# Patient Record
Sex: FEMALE | Race: WHITE | NOT HISPANIC OR LATINO | Employment: FULL TIME | URBAN - METROPOLITAN AREA
[De-identification: names, ages, dates, MRNs, and addresses within clinical notes are randomized per-mention and may not be internally consistent; named-entity substitution may affect disease eponyms.]

---

## 2017-05-12 ENCOUNTER — HOSPITAL ENCOUNTER (INPATIENT)
Facility: HOSPITAL | Age: 47
LOS: 4 days | Discharge: HOME | DRG: 389 | End: 2017-05-17
Attending: EMERGENCY MEDICINE | Admitting: SURGERY
Payer: COMMERCIAL

## 2017-05-12 DIAGNOSIS — K56.609 SMALL BOWEL OBSTRUCTION: ICD-10-CM

## 2017-05-12 DIAGNOSIS — R11.2 NON-INTRACTABLE VOMITING WITH NAUSEA, UNSPECIFIED VOMITING TYPE: ICD-10-CM

## 2017-05-12 DIAGNOSIS — R10.84 GENERALIZED ABDOMINAL PAIN: ICD-10-CM

## 2017-05-12 DIAGNOSIS — K56.609 SBO (SMALL BOWEL OBSTRUCTION): Primary | ICD-10-CM

## 2017-05-12 DIAGNOSIS — E87.6 HYPOKALEMIA: ICD-10-CM

## 2017-05-12 LAB
BASOPHILS # BLD AUTO: 0.05 K/UL
BASOPHILS NFR BLD: 0.3 %
DIFFERENTIAL METHOD: ABNORMAL
EOSINOPHIL # BLD AUTO: 0.1 K/UL
EOSINOPHIL NFR BLD: 0.3 %
ERYTHROCYTE [DISTWIDTH] IN BLOOD BY AUTOMATED COUNT: 12.8 %
HCT VFR BLD AUTO: 40.5 %
HGB BLD-MCNC: 14.4 G/DL
LACTATE SERPL-SCNC: 4.1 MMOL/L
LYMPHOCYTES # BLD AUTO: 2.2 K/UL
LYMPHOCYTES NFR BLD: 11.7 %
MCH RBC QN AUTO: 30.5 PG
MCHC RBC AUTO-ENTMCNC: 35.6 %
MCV RBC AUTO: 86 FL
MONOCYTES # BLD AUTO: 1.2 K/UL
MONOCYTES NFR BLD: 6.4 %
NEUTROPHILS # BLD AUTO: 15.4 K/UL
NEUTROPHILS NFR BLD: 81 %
PLATELET # BLD AUTO: 271 K/UL
PMV BLD AUTO: 9.5 FL
RBC # BLD AUTO: 4.72 M/UL
WBC # BLD AUTO: 18.99 K/UL

## 2017-05-12 PROCEDURE — 96375 TX/PRO/DX INJ NEW DRUG ADDON: CPT

## 2017-05-12 PROCEDURE — 81025 URINE PREGNANCY TEST: CPT | Performed by: EMERGENCY MEDICINE

## 2017-05-12 PROCEDURE — 25000003 PHARM REV CODE 250: Performed by: EMERGENCY MEDICINE

## 2017-05-12 PROCEDURE — 11000001 HC ACUTE MED/SURG PRIVATE ROOM

## 2017-05-12 PROCEDURE — 80053 COMPREHEN METABOLIC PANEL: CPT

## 2017-05-12 PROCEDURE — 96361 HYDRATE IV INFUSION ADD-ON: CPT

## 2017-05-12 PROCEDURE — 63600175 PHARM REV CODE 636 W HCPCS: Performed by: EMERGENCY MEDICINE

## 2017-05-12 PROCEDURE — 83690 ASSAY OF LIPASE: CPT

## 2017-05-12 PROCEDURE — 96374 THER/PROPH/DIAG INJ IV PUSH: CPT

## 2017-05-12 PROCEDURE — 99285 EMERGENCY DEPT VISIT HI MDM: CPT | Mod: 25

## 2017-05-12 PROCEDURE — 85025 COMPLETE CBC W/AUTO DIFF WBC: CPT

## 2017-05-12 PROCEDURE — 83605 ASSAY OF LACTIC ACID: CPT

## 2017-05-12 PROCEDURE — 99285 EMERGENCY DEPT VISIT HI MDM: CPT | Mod: ,,, | Performed by: EMERGENCY MEDICINE

## 2017-05-12 RX ORDER — ESCITALOPRAM OXALATE 10 MG/1
10 TABLET ORAL DAILY
COMMUNITY
End: 2017-10-03

## 2017-05-12 RX ORDER — MORPHINE SULFATE 2 MG/ML
6 INJECTION, SOLUTION INTRAMUSCULAR; INTRAVENOUS
Status: COMPLETED | OUTPATIENT
Start: 2017-05-12 | End: 2017-05-12

## 2017-05-12 RX ORDER — ONDANSETRON 2 MG/ML
4 INJECTION INTRAMUSCULAR; INTRAVENOUS
Status: COMPLETED | OUTPATIENT
Start: 2017-05-12 | End: 2017-05-12

## 2017-05-12 RX ADMIN — MORPHINE SULFATE 6 MG: 2 INJECTION, SOLUTION INTRAMUSCULAR; INTRAVENOUS at 11:05

## 2017-05-12 RX ADMIN — ONDANSETRON 4 MG: 2 INJECTION INTRAMUSCULAR; INTRAVENOUS at 11:05

## 2017-05-12 RX ADMIN — SODIUM CHLORIDE, SODIUM LACTATE, POTASSIUM CHLORIDE, AND CALCIUM CHLORIDE 1000 ML: .6; .31; .03; .02 INJECTION, SOLUTION INTRAVENOUS at 11:05

## 2017-05-12 NOTE — IP AVS SNAPSHOT
[facility]  [address]  [facility] [address]  Phone: [phone]           Patient Discharge Instructions   Our goal is to set you up for success. This packet includes information on your condition, medications, and your home care.  It will help you care for yourself to prevent having to return to the hospital.     Please ask your nurse if you have any questions.      There are many details to remember when preparing to leave the hospital. Here is what you will need to do:    1. Take your medicine. If you are prescribed medications, review your Medication List on the following pages. You may have new medications to  at the pharmacy and others that you'll need to stop taking. Review the instructions for how and when to take your medications. Talk with your doctor or nurses if you are unsure of what to do.     2. Go to your follow-up appointments. Specific follow-up information is listed in the following pages. Your may be contacted by a nurse or clinical provider about future appointments. Be sure we have all of the phone numbers to reach you. Please contact your provider's office if you are unable to make an appointment.     3. Watch for warning signs. Your doctor or nurse will give you detailed warning signs to watch for and when to call for assistance. These instructions may also include educational information about your condition. If you experience any of warning signs to your health, call your doctor.           [facility]  Unless otherwise directed by your provider, please   contact [facility], our nurse care line   that is available for 24/7 assistance.     [phone] (toll-free)     Registered nurses in the [facility]   provide: appointment scheduling, clinical advisement, health education, and other advisory services.                  ** Verify the list of medication(s) below is accurate and up to date. Carry this with you in case of  emergency. If your medications have changed, please notify your healthcare provider.             Medication List      START taking these medications        Additional Info                      docusate sodium 50 MG capsule   Commonly known as:  COLACE   Refills:  0   Dose:  50 mg    Last time this was given:  50 mg on 5/17/2017  8:38 AM   Instructions:  Take 1 capsule (50 mg total) by mouth once daily.     Begin Date    AM    Noon    PM    Bedtime       Lactobacillus rhamnosus GG 10 billion cell capsule   Commonly known as:  CULTURELLE   Refills:  0   Dose:  1 capsule    Last time this was given:  1 capsule on 5/17/2017  8:38 AM   Instructions:  Take 1 capsule by mouth once daily.     Begin Date    AM    Noon    PM    Bedtime       ondansetron 4 MG Tbdl   Commonly known as:  ZOFRAN-ODT   Quantity:  20 tablet   Refills:  0   Dose:  4 mg    Instructions:  Take 1 tablet (4 mg total) by mouth every 8 (eight) hours as needed.     Begin Date    AM    Noon    PM    Bedtime       polyethylene glycol 17 gram Pwpk   Commonly known as:  GLYCOLAX   Refills:  0   Dose:  17 g    Last time this was given:  17 g on 5/17/2017  8:38 AM   Instructions:  Take 17 g by mouth once daily.     Begin Date    AM    Noon    PM    Bedtime         CONTINUE taking these medications        Additional Info                      escitalopram oxalate 10 MG tablet   Commonly known as:  LEXAPRO   Refills:  0   Dose:  10 mg    Instructions:  Take 10 mg by mouth once daily.     Begin Date    AM    Noon    PM    Bedtime            Where to Get Your Medications      These medications were sent to [email or web address] [id number]  [facility] [address] [facility] [address]  [facility] [place] [address]     Phone:  [phone]     ondansetron 4 MG Tbdl         You can get these medications from any pharmacy     You don't need a prescription for these medications     docusate sodium 50 MG capsule    Lactobacillus  rhamnosus GG 10 billion cell capsule         Information about where to get these medications is not yet available     ! Ask your nurse or doctor about these medications     polyethylene glycol 17 gram Pwpk                  Please bring to all follow up appointments:    1. A copy of your discharge instructions.  2. All medicines you are currently taking in their original bottles.  3. Identification and insurance card.    Please arrive 15 minutes ahead of scheduled appointment time.    Please call 24 hours in advance if you must reschedule your appointment and/or time.        Your Scheduled Appointments     May 30, 2017  9:30 AM CDT   Hospital Follow Up with TORIN Dumont - Internal Medicine ([facility])    [address] [place] [id number]   [phone]            Jul 25, 2017 10:40 AM CDT   Physical with MD Heath Albright josh - Internal Medicine ([facility])    [address] [place] [address]   [phone]              Follow-up Information     Follow up with Alisha Barreto PA-C On 5/30/2017.    Specialty:  Internal Medicine    Why:  Hospital follow-up for SBO: 1. NP Appt: 5/30/17 at 09:30 AM. 2. Establish care w/Primary Care Dr (PCP) Dr. Jamal Pereira no appts available until: 7/25/17 at 10:40 AM.     Contact information:    [address] [place] [address]  [phone]          Discharge Instructions     Future Orders    Activity as tolerated     Call MD for:  difficulty breathing or increased cough     Call MD for:  persistent nausea and vomiting or diarrhea     Call MD for:  redness, tenderness, or signs of infection (pain, swelling, redness, odor or green/yellow discharge around incision site)     Call MD for:  severe uncontrolled pain     Call MD for:  temperature >100.4     Diet general     Questions:    Total calories:      Fat restriction, if any:      Protein  "restriction, if any:      Na restriction, if any:      Fluid restriction:      Additional restrictions:          Primary Diagnosis     Your primary diagnosis was:  Small Bowel Obstruction      Admission Information     Date & Time Provider Department CSN    5/12/2017 10:58 PM Baldemar Vasquez MD [facility] [id number]      Care Providers     Provider Role Specialty Primary office phone    Baldemar Vasquez MD Attending Provider General Surgery [phone]      Your Vitals Were     BP Pulse Temp Resp Height Weight    144/69 (BP Location: Left arm, Patient Position: Lying, BP Method: Automatic) 60 98.6 °F (37 °C) (Oral) 18 5' 4" (1.626 m) 50.8 kg (112 lb)    Last Period SpO2 BMI          04/21/2017 98% 19.22 kg/m2        Recent Lab Values     No lab values to display.      Allergies as of 5/17/2017     No Known Allergies      Advance Directives     An advance directive is a document which, in the event you are no longer able to make decisions for yourself, tells your healthcare team what kind of treatment you do or do not want to receive, or who you would like to make those decisions for you.  If you do not currently have an advance directive, [facility] encourages you to create one.  For more information call:  [phone]), [phone] ([phone]),  or log on to [email or web address].        Language Assistance Services     ATTENTION: Language assistance services are available, free of charge. Please call [phone].      ATENCIÓN: Si habla español, tiene a painter disposición servicios gratuitos de asistencia lingüística. Llame al [phone].     [facility] Ý: N?u b?n nói Ti?ng Vi?t, có các d?ch v? h? tr? ngôn ng? mi?n phí dành cho b?n. G?i s? [phone].         [facility] complies with applicable Federal civil rights laws and does not discriminate on the basis of race, color, national origin, age, disability, or sex.        "

## 2017-05-13 PROBLEM — K56.609 SMALL BOWEL OBSTRUCTION: Status: ACTIVE | Noted: 2017-05-13

## 2017-05-13 PROBLEM — K56.609 SBO (SMALL BOWEL OBSTRUCTION): Status: ACTIVE | Noted: 2017-05-13

## 2017-05-13 LAB
ALBUMIN SERPL BCP-MCNC: 4.8 G/DL
ALP SERPL-CCNC: 44 U/L
ALT SERPL W/O P-5'-P-CCNC: 13 U/L
ANION GAP SERPL CALC-SCNC: 15 MMOL/L
ANION GAP SERPL CALC-SCNC: 8 MMOL/L
AST SERPL-CCNC: 17 U/L
B-HCG UR QL: NEGATIVE
BACTERIA #/AREA URNS AUTO: NORMAL /HPF
BASOPHILS # BLD AUTO: 0.02 K/UL
BASOPHILS NFR BLD: 0.2 %
BILIRUB SERPL-MCNC: 0.8 MG/DL
BILIRUB UR QL STRIP: NEGATIVE
BUN SERPL-MCNC: 11 MG/DL
BUN SERPL-MCNC: 18 MG/DL
CALCIUM SERPL-MCNC: 10.8 MG/DL
CALCIUM SERPL-MCNC: 8.8 MG/DL
CHLORIDE SERPL-SCNC: 101 MMOL/L
CHLORIDE SERPL-SCNC: 108 MMOL/L
CLARITY UR REFRACT.AUTO: ABNORMAL
CO2 SERPL-SCNC: 21 MMOL/L
CO2 SERPL-SCNC: 25 MMOL/L
COLOR UR AUTO: YELLOW
CREAT SERPL-MCNC: 0.8 MG/DL
CREAT SERPL-MCNC: 1 MG/DL
CTP QC/QA: YES
DIFFERENTIAL METHOD: ABNORMAL
EOSINOPHIL # BLD AUTO: 0.1 K/UL
EOSINOPHIL NFR BLD: 0.6 %
ERYTHROCYTE [DISTWIDTH] IN BLOOD BY AUTOMATED COUNT: 13.3 %
EST. GFR  (AFRICAN AMERICAN): >60 ML/MIN/1.73 M^2
EST. GFR  (AFRICAN AMERICAN): >60 ML/MIN/1.73 M^2
EST. GFR  (NON AFRICAN AMERICAN): >60 ML/MIN/1.73 M^2
EST. GFR  (NON AFRICAN AMERICAN): >60 ML/MIN/1.73 M^2
GLUCOSE SERPL-MCNC: 103 MG/DL
GLUCOSE SERPL-MCNC: 168 MG/DL
GLUCOSE UR QL STRIP: NEGATIVE
HCT VFR BLD AUTO: 37.4 %
HGB BLD-MCNC: 12.5 G/DL
HGB UR QL STRIP: NEGATIVE
HYALINE CASTS UR QL AUTO: 0 /LPF
KETONES UR QL STRIP: ABNORMAL
LACTATE SERPL-SCNC: 0.8 MMOL/L
LEUKOCYTE ESTERASE UR QL STRIP: NEGATIVE
LIPASE SERPL-CCNC: 9 U/L
LYMPHOCYTES # BLD AUTO: 2.6 K/UL
LYMPHOCYTES NFR BLD: 20.9 %
MAGNESIUM SERPL-MCNC: 1.9 MG/DL
MCH RBC QN AUTO: 30.3 PG
MCHC RBC AUTO-ENTMCNC: 33.4 %
MCV RBC AUTO: 91 FL
MICROSCOPIC COMMENT: NORMAL
MONOCYTES # BLD AUTO: 0.8 K/UL
MONOCYTES NFR BLD: 6.2 %
NEUTROPHILS # BLD AUTO: 8.9 K/UL
NEUTROPHILS NFR BLD: 71.9 %
NITRITE UR QL STRIP: NEGATIVE
PH UR STRIP: >8 [PH] (ref 5–8)
PHOSPHATE SERPL-MCNC: 4.1 MG/DL
PLATELET # BLD AUTO: 232 K/UL
PMV BLD AUTO: 9.5 FL
POTASSIUM SERPL-SCNC: 3.4 MMOL/L
POTASSIUM SERPL-SCNC: 4.3 MMOL/L
PROT SERPL-MCNC: 8.3 G/DL
PROT UR QL STRIP: ABNORMAL
RBC # BLD AUTO: 4.12 M/UL
RBC #/AREA URNS AUTO: 1 /HPF (ref 0–4)
SODIUM SERPL-SCNC: 137 MMOL/L
SODIUM SERPL-SCNC: 141 MMOL/L
SP GR UR STRIP: 1.03 (ref 1–1.03)
SQUAMOUS #/AREA URNS AUTO: 1 /HPF
URN SPEC COLLECT METH UR: ABNORMAL
UROBILINOGEN UR STRIP-ACNC: NEGATIVE EU/DL
WBC # BLD AUTO: 12.32 K/UL
WBC #/AREA URNS AUTO: 0 /HPF (ref 0–5)

## 2017-05-13 PROCEDURE — 63600175 PHARM REV CODE 636 W HCPCS: Performed by: SURGERY

## 2017-05-13 PROCEDURE — 25000003 PHARM REV CODE 250: Performed by: SURGERY

## 2017-05-13 PROCEDURE — 11000001 HC ACUTE MED/SURG PRIVATE ROOM

## 2017-05-13 PROCEDURE — 99223 1ST HOSP IP/OBS HIGH 75: CPT | Mod: ,,, | Performed by: SURGERY

## 2017-05-13 PROCEDURE — 81001 URINALYSIS AUTO W/SCOPE: CPT

## 2017-05-13 PROCEDURE — 80048 BASIC METABOLIC PNL TOTAL CA: CPT

## 2017-05-13 PROCEDURE — 85025 COMPLETE CBC W/AUTO DIFF WBC: CPT

## 2017-05-13 PROCEDURE — 36415 COLL VENOUS BLD VENIPUNCTURE: CPT

## 2017-05-13 PROCEDURE — 25500020 PHARM REV CODE 255: Performed by: EMERGENCY MEDICINE

## 2017-05-13 PROCEDURE — 63600175 PHARM REV CODE 636 W HCPCS: Performed by: STUDENT IN AN ORGANIZED HEALTH CARE EDUCATION/TRAINING PROGRAM

## 2017-05-13 PROCEDURE — 25000003 PHARM REV CODE 250: Performed by: EMERGENCY MEDICINE

## 2017-05-13 PROCEDURE — 84100 ASSAY OF PHOSPHORUS: CPT

## 2017-05-13 PROCEDURE — 83735 ASSAY OF MAGNESIUM: CPT

## 2017-05-13 PROCEDURE — 83605 ASSAY OF LACTIC ACID: CPT

## 2017-05-13 RX ORDER — MORPHINE SULFATE 2 MG/ML
4 INJECTION, SOLUTION INTRAMUSCULAR; INTRAVENOUS EVERY 4 HOURS PRN
Status: DISCONTINUED | OUTPATIENT
Start: 2017-05-13 | End: 2017-05-14

## 2017-05-13 RX ORDER — LIDOCAINE HYDROCHLORIDE 20 MG/ML
JELLY TOPICAL
Status: DISCONTINUED | OUTPATIENT
Start: 2017-05-13 | End: 2017-05-17 | Stop reason: HOSPADM

## 2017-05-13 RX ORDER — LIDOCAINE HYDROCHLORIDE 20 MG/ML
JELLY TOPICAL
Status: COMPLETED | OUTPATIENT
Start: 2017-05-13 | End: 2017-05-13

## 2017-05-13 RX ORDER — MAGNESIUM SULFATE HEPTAHYDRATE 40 MG/ML
2 INJECTION, SOLUTION INTRAVENOUS ONCE
Status: COMPLETED | OUTPATIENT
Start: 2017-05-13 | End: 2017-05-13

## 2017-05-13 RX ORDER — SODIUM CHLORIDE 0.9 % (FLUSH) 0.9 %
3 SYRINGE (ML) INJECTION EVERY 8 HOURS
Status: DISCONTINUED | OUTPATIENT
Start: 2017-05-13 | End: 2017-05-17 | Stop reason: HOSPADM

## 2017-05-13 RX ORDER — KETOROLAC TROMETHAMINE 15 MG/ML
15 INJECTION, SOLUTION INTRAMUSCULAR; INTRAVENOUS ONCE
Status: ACTIVE | OUTPATIENT
Start: 2017-05-13 | End: 2017-05-16

## 2017-05-13 RX ORDER — MORPHINE SULFATE 2 MG/ML
2 INJECTION, SOLUTION INTRAMUSCULAR; INTRAVENOUS EVERY 4 HOURS PRN
Status: DISCONTINUED | OUTPATIENT
Start: 2017-05-13 | End: 2017-05-14

## 2017-05-13 RX ORDER — SODIUM CHLORIDE 9 MG/ML
INJECTION, SOLUTION INTRAVENOUS CONTINUOUS
Status: DISCONTINUED | OUTPATIENT
Start: 2017-05-13 | End: 2017-05-16

## 2017-05-13 RX ORDER — ENOXAPARIN SODIUM 100 MG/ML
40 INJECTION SUBCUTANEOUS
Status: DISCONTINUED | OUTPATIENT
Start: 2017-05-13 | End: 2017-05-17 | Stop reason: HOSPADM

## 2017-05-13 RX ORDER — ONDANSETRON 2 MG/ML
4 INJECTION INTRAMUSCULAR; INTRAVENOUS EVERY 6 HOURS PRN
Status: DISCONTINUED | OUTPATIENT
Start: 2017-05-13 | End: 2017-05-17 | Stop reason: HOSPADM

## 2017-05-13 RX ORDER — LORAZEPAM 2 MG/ML
0.5 INJECTION INTRAMUSCULAR
Status: COMPLETED | OUTPATIENT
Start: 2017-05-13 | End: 2017-05-13

## 2017-05-13 RX ADMIN — MORPHINE SULFATE 4 MG: 2 INJECTION, SOLUTION INTRAMUSCULAR; INTRAVENOUS at 10:05

## 2017-05-13 RX ADMIN — IOHEXOL 75 ML: 350 INJECTION, SOLUTION INTRAVENOUS at 01:05

## 2017-05-13 RX ADMIN — SODIUM CHLORIDE, SODIUM LACTATE, POTASSIUM CHLORIDE, AND CALCIUM CHLORIDE 1000 ML: .6; .31; .03; .02 INJECTION, SOLUTION INTRAVENOUS at 12:05

## 2017-05-13 RX ADMIN — SODIUM CHLORIDE: 0.9 INJECTION, SOLUTION INTRAVENOUS at 03:05

## 2017-05-13 RX ADMIN — Medication 3 ML: at 02:05

## 2017-05-13 RX ADMIN — MAGNESIUM SULFATE IN WATER 2 G: 40 INJECTION, SOLUTION INTRAVENOUS at 11:05

## 2017-05-13 RX ADMIN — LIDOCAINE HYDROCHLORIDE 10 ML: 20 JELLY TOPICAL at 03:05

## 2017-05-13 RX ADMIN — MORPHINE SULFATE 2 MG: 2 INJECTION, SOLUTION INTRAMUSCULAR; INTRAVENOUS at 05:05

## 2017-05-13 RX ADMIN — MORPHINE SULFATE 4 MG: 2 INJECTION, SOLUTION INTRAMUSCULAR; INTRAVENOUS at 08:05

## 2017-05-13 RX ADMIN — LORAZEPAM 0.5 MG: 2 INJECTION INTRAMUSCULAR; INTRAVENOUS at 02:05

## 2017-05-13 RX ADMIN — MORPHINE SULFATE 2 MG: 2 INJECTION, SOLUTION INTRAMUSCULAR; INTRAVENOUS at 02:05

## 2017-05-13 RX ADMIN — SODIUM CHLORIDE: 0.9 INJECTION, SOLUTION INTRAVENOUS at 02:05

## 2017-05-13 RX ADMIN — TOPICAL ANESTHETIC: 200 SPRAY DENTAL; PERIODONTAL at 03:05

## 2017-05-13 NOTE — ASSESSMENT & PLAN NOTE
47 y.o. female with history of pelvic surgery and IBS now presenting with acute onset of crampy abdominal discomfort, nausea and emesis. CT consistent with likely adhesive SBO, partial by history and exam.     -Admit to surgery, floor  -NPO  -NG tube placement; LIWS  -Ambulate  -DVT ppx  -IV fluids  -Lactic acidosis and leukocytosis - possibly dehydration related; monitor, hydrate and repeat  -Serial exams

## 2017-05-13 NOTE — PROVIDER PROGRESS NOTES - EMERGENCY DEPT.
Encounter Date: 5/12/2017    ED Physician Progress Notes       SCRIBE NOTE: I, Melchor Avilez, am scribing for, and in the presence of,  Dr. Squires.  Physician Statement: I, Dr. Squires, personally performed the services described in this documentation as scribed by Melchor Avilez in my presence, and it is both accurate and complete.     Physician Note:   This is a 47 y.o. female who presents for evaluation of acute onset severe abdominal pain with nausea and vomiting.     I initially evaluated this patient and ordered workup while in intake.  The patient will receive a full evaluation in an ED pod when space is available.  All results from tests ordered in intake will not be followed by the intake team, including myself. All results will be followed by the ED Pod team.

## 2017-05-13 NOTE — ED PROVIDER NOTES
Encounter Date: 5/12/2017    SCRIBE #1 NOTE: I, Marcos Saenz, am scribing for, and in the presence of,  Dr. Barragan. I have scribed the following portions of the note - the Resident attestation.       History     Chief Complaint   Patient presents with    Abdominal Pain     abdominal pain, nausea and vomiting     Review of patient's allergies indicates:  No Known Allergies  The history is provided by the patient and medical records.     47-year-old woman with history of surgical ovarian cyst removal in her 30s, chronic GI issues, depression presents with new diffuse abdominal pain, started around 7 PM and increased in intensity to severe, waxes and wanes in intensity but never entirely goes away, associated with nausea and 9 episodes of nonbloody vomiting.  Denies fever, diarrhea, constipation, dysuria, hematuria, vaginal bleeding, vaginal discharge.    Past Medical History:   Diagnosis Date    Depression      History reviewed. No pertinent surgical history.  History reviewed. No pertinent family history.  Social History   Substance Use Topics    Smoking status: Never Smoker    Smokeless tobacco: None    Alcohol use Yes     Review of Systems   Constitutional: Negative for chills and fever.   HENT: Negative for drooling and facial swelling.    Eyes: Negative for discharge and redness.   Respiratory: Negative for shortness of breath and stridor.    Cardiovascular: Negative for chest pain and leg swelling.   Gastrointestinal: Positive for abdominal pain, nausea and vomiting. Negative for constipation and diarrhea.   Genitourinary: Negative for dysuria, hematuria, vaginal bleeding and vaginal discharge.   Musculoskeletal: Negative for joint swelling and neck stiffness.   Skin: Negative for rash and wound.   Neurological: Negative for facial asymmetry and speech difficulty.   Psychiatric/Behavioral: Negative for agitation and confusion.       Physical Exam   Initial Vitals   BP Pulse Resp Temp SpO2   05/12/17 2247  05/12/17 2247 05/12/17 2247 05/12/17 2247 05/12/17 2247   116/55 70 16 97.9 °F (36.6 °C) 100 %     Physical Exam    Nursing note and vitals reviewed.  Constitutional: She appears well-developed and well-nourished. She is not diaphoretic. No distress.   HENT:   Head: Normocephalic and atraumatic.   Right Ear: External ear normal.   Left Ear: External ear normal.   Nose: Nose normal.   Mouth/Throat: Oropharynx is clear and moist.   Eyes: Conjunctivae are normal. Pupils are equal, round, and reactive to light. Right eye exhibits no discharge. Left eye exhibits no discharge. No scleral icterus.   Neck: Neck supple. No thyromegaly present. No tracheal deviation present. No JVD present.   Cardiovascular: Normal rate, regular rhythm, normal heart sounds and intact distal pulses. Exam reveals no gallop and no friction rub.    No murmur heard.  Pulmonary/Chest: Breath sounds normal. No stridor. No respiratory distress. She has no wheezes. She has no rhonchi. She has no rales. She exhibits no tenderness.   Abdominal: Soft. Bowel sounds are normal. She exhibits no distension and no mass. There is no tenderness. There is no rebound and no guarding.   Musculoskeletal: Normal range of motion. She exhibits no edema.   Lymphadenopathy:     She has no cervical adenopathy.   Neurological: She is alert and oriented to person, place, and time.   Skin: Skin is warm and dry.   Psychiatric: She has a normal mood and affect. Her behavior is normal.         ED Course   Procedures  Labs Reviewed   CBC W/ AUTO DIFFERENTIAL - Abnormal; Notable for the following:        Result Value    WBC 18.99 (*)     Gran # 15.4 (*)     Mono # 1.2 (*)     Gran% 81.0 (*)     Lymph% 11.7 (*)     All other components within normal limits   COMPREHENSIVE METABOLIC PANEL - Abnormal; Notable for the following:     Potassium 3.4 (*)     CO2 21 (*)     Glucose 168 (*)     Calcium 10.8 (*)     Alkaline Phosphatase 44 (*)     All other components within normal limits    URINALYSIS - Abnormal; Notable for the following:     Appearance, UA Hazy (*)     pH, UA >8.0 (*)     Protein, UA 1+ (*)     Ketones, UA 2+ (*)     All other components within normal limits   LACTIC ACID, PLASMA - Abnormal; Notable for the following:     Lactate (Lactic Acid) 4.1 (*)     All other components within normal limits   LIPASE   URINALYSIS MICROSCOPIC   BASIC METABOLIC PANEL   MAGNESIUM   PHOSPHORUS   CBC W/ AUTO DIFFERENTIAL   LACTIC ACID, PLASMA   POCT URINE PREGNANCY          HOII MDM:  Jennifer Caballero is a 47 y.o. female with history of surgical ovarian cyst removal in her 30s, chronic GI issues, depression presents with abdominal pain, nausea, and vomiting.  My exam after patient had received morphine and Zofran was normal, no abdominal tenderness.  Ddx includes gastritis, gastroenteritis, pancreatitis, cholecystitis, appendicitis, abscess, volvulus.    White count 19,000, elevated granulocytes.  Patient states that she had a steroid shot 5 days ago which may explain her leukocytosis.  Lactate 4.1.  CMP unremarkable including normal transaminases, low alkaline phosphatase, normal total bilirubin, normal lipase.  UA negative, UPT negative.    2 L LR for elevated lactate.  CT abdomen and pelvis with contrast pending.    RANDY Sexton 1:06 AM 05/13/2017    CT abdomen and pelvis with contrast significant for small bowel obstruction with questionable transition point.  No perforation or free fluid.    Consultation general surgery, anticipate admit to their service.    RANDY Sexton 1:47 AM 05/13/2017    General surgery admitting.    RANDY Sexton 3:23 AM 05/13/2017         Medical Decision Making:   History:   Old Medical Records: I decided to obtain old medical records.  Clinical Tests:   Lab Tests: Ordered and Reviewed  Radiological Study: Reviewed and Ordered            Scribe Attestation:   Scribe #1: I performed the above scribed service and the documentation  accurately describes the services I performed. I attest to the accuracy of the note.    Attending Attestation:   Physician Attestation Statement for Resident:  As the supervising MD   Physician Attestation Statement: I have personally seen and examined this patient.   I agree with the above history. -:   As the supervising MD I agree with the above PE.    As the supervising MD I agree with the above treatment, course, plan, and disposition.   -:     Healthy 47 y.o. female presents with abdominal pain, vomiting. Workup concerning for elevated WBC count and lactate. CT shows small bowel obstruction. We will admit to surgery with NG tube.   I have reviewed and agree with the residents interpretation of the following: lab data and CT scans.  I have reviewed the following: old records at this facility.          Physician Attestation for Scribe:  Physician Attestation Statement for Scribe #1: I, Dr. Barragan, reviewed documentation, as scribed by Marcos Saenz in my presence, and it is both accurate and complete.                 ED Course     Clinical Impression:   The primary encounter diagnosis was SBO (small bowel obstruction). Diagnoses of Generalized abdominal pain and Non-intractable vomiting with nausea, unspecified vomiting type were also pertinent to this visit.    Disposition:   Disposition: Admitted       Baldemar Parker MD  Resident  05/13/17 0323       Black Barragan MD  05/13/17 0337

## 2017-05-13 NOTE — NURSING
At 0345 Patient admitted to room 523A from the Emergency Room.  AAO x3.  Vital sings stable.  Afebrile.  NG tube to right nostril, checked for placement then connected to low intermittent wall suction - draining clear secretions.  18g catheter to the right antecubital, NS at 125cc/hr started. Patient refused SCD's and her enoxaparin injection; otherwise, she was cooperative this shift.  Charge nurse informed.  Patient stated she is in no pain and she plans on going home today so she can spend Mother's Day with her mother.

## 2017-05-13 NOTE — ED TRIAGE NOTES
Jennifer Caballero, a 47 y.o. female presents to the ED with c/o generalized abdominal pain that began today accompanied by N/V and diarrhea.       Chief Complaint   Patient presents with    Abdominal Pain     abdominal pain, nausea and vomiting     Review of patient's allergies indicates:  Allergies not on file  No past medical history on file.

## 2017-05-13 NOTE — SUBJECTIVE & OBJECTIVE
No current facility-administered medications on file prior to encounter.      No current outpatient prescriptions on file prior to encounter.       Review of patient's allergies indicates:  No Known Allergies    Past Medical History:   Diagnosis Date    Depression      History reviewed. No pertinent surgical history.  Family History     None        Social History Main Topics    Smoking status: Never Smoker    Smokeless tobacco: Not on file    Alcohol use Yes    Drug use: Not on file    Sexual activity: Not on file     Review of Systems   Constitutional: Negative for activity change, chills and fever.   HENT: Negative.    Respiratory: Negative.    Cardiovascular: Negative.    Gastrointestinal: Positive for abdominal pain, nausea and vomiting. Negative for abdominal distention.   Genitourinary: Negative.    Musculoskeletal: Negative.    Allergic/Immunologic: Negative for immunocompromised state.   Hematological: Does not bruise/bleed easily.   Psychiatric/Behavioral: The patient is nervous/anxious.      Objective:     Vital Signs (Most Recent):  Temp: 97.9 °F (36.6 °C) (05/12/17 2247)  Pulse: 65 (05/13/17 0031)  Resp: 16 (05/12/17 2247)  BP: 116/63 (05/13/17 0031)  SpO2: 100 % (05/13/17 0031) Vital Signs (24h Range):  Temp:  [97.9 °F (36.6 °C)] 97.9 °F (36.6 °C)  Pulse:  [65-79] 65  Resp:  [16] 16  SpO2:  [99 %-100 %] 100 %  BP: (103-134)/(55-63) 116/63     Weight: 50.8 kg (112 lb)  Body mass index is 19.22 kg/(m^2).    Physical Exam   Constitutional: She is oriented to person, place, and time. She appears well-developed and well-nourished. Distressed: anxious.   HENT:   Head: Normocephalic and atraumatic.   Eyes: EOM are normal. Pupils are equal, round, and reactive to light.   Neck: Normal range of motion. Neck supple.   Cardiovascular: Normal rate and regular rhythm.    Pulmonary/Chest: Effort normal and breath sounds normal. No respiratory distress.   Abdominal: Soft. She exhibits no distension. There is no  tenderness (non tender to my examination). There is no rebound and no guarding.   Musculoskeletal: Normal range of motion.   Neurological: She is alert and oriented to person, place, and time.   Skin: Skin is warm.   Psychiatric:   Very anxious       Significant Labs:  CBC:   Recent Labs  Lab 05/12/17  2319   WBC 18.99*   RBC 4.72   HGB 14.4   HCT 40.5      MCV 86   MCH 30.5   MCHC 35.6     BMP:   Recent Labs  Lab 05/12/17  2319   *      K 3.4*      CO2 21*   BUN 18   CREATININE 1.0   CALCIUM 10.8*     LFTs:   Recent Labs  Lab 05/12/17  2319   ALT 13   AST 17   ALKPHOS 44*   BILITOT 0.8   PROT 8.3   ALBUMIN 4.8     LA: 4    Significant Diagnostics:  I have reviewed all pertinent imaging results/findings within the past 24 hours.   CT Abdomen / Pelvis:  Multiple loops of dilated small bowel, with a questionable transition zone in the midline lower abdomen, suggestive of  an early distal small bowel obstruction.  No evidence of perforation.  There is a small amount of ascites surrounding the liver.  Multiple uterine fibroids.

## 2017-05-13 NOTE — H&P
[facility]  General Surgery  History & Physical    Patient Name: Jennifer Caballero  MRN: [id number]  Admission Date: 5/12/2017  Attending Physician: MD Pedro  Primary Care Provider: Luis Lopez MD    Patient information was obtained from patient, parent and ER records.     Subjective:     Chief Complaint/Reason for Admission: SBO    History of Present Illness: 47 y.o. female presenting with crampy severe mid/lower abdominal discomfort starting this evening around 5pm associated with multiple bouts of nausea and emesis. Has previously had a large fibroid removed via lower transverse incision about 10 years ago. Feeling better following pain and nausea medications but states she can still feel cramps. No fevers, chills, CP or SOB. No prior SBO. Last BM yesterday. Continues to pass flatus and some burping today.  Does have a history of self diagnosed IBS (mom with IBS) and lactose intolerance. Chronically with sporadic abdominal discomfort, cramps and diarrhea but this episode is different.      No current facility-administered medications on file prior to encounter.      No current outpatient prescriptions on file prior to encounter.       Review of patient's allergies indicates:  No Known Allergies    Past Medical History:   Diagnosis Date    Depression      History reviewed. No pertinent surgical history.  Family History     None        Social History Main Topics    Smoking status: Never Smoker    Smokeless tobacco: Not on file    Alcohol use Yes    Drug use: Not on file    Sexual activity: Not on file     Review of Systems   Constitutional: Negative for activity change, chills and fever.   HENT: Negative.    Respiratory: Negative.    Cardiovascular: Negative.    Gastrointestinal: Positive for abdominal pain, nausea and vomiting. Negative for abdominal distention.   Genitourinary: Negative.    Musculoskeletal: Negative.    Allergic/Immunologic: Negative for immunocompromised state.    Hematological: Does not bruise/bleed easily.   Psychiatric/Behavioral: The patient is nervous/anxious.      Objective:     Vital Signs (Most Recent):  Temp: 97.9 °F (36.6 °C) (05/12/17 2247)  Pulse: 65 (05/13/17 0031)  Resp: 16 (05/12/17 2247)  BP: 116/63 (05/13/17 0031)  SpO2: 100 % (05/13/17 0031) Vital Signs (24h Range):  Temp:  [97.9 °F (36.6 °C)] 97.9 °F (36.6 °C)  Pulse:  [65-79] 65  Resp:  [16] 16  SpO2:  [99 %-100 %] 100 %  BP: (103-134)/(55-63) 116/63     Weight: 50.8 kg (112 lb)  Body mass index is 19.22 kg/(m^2).    Physical Exam   Constitutional: She is oriented to person, place, and time. She appears well-developed and well-nourished. Distressed: anxious.   HENT:   Head: Normocephalic and atraumatic.   Eyes: EOM are normal. Pupils are equal, round, and reactive to light.   Neck: Normal range of motion. Neck supple.   Cardiovascular: Normal rate and regular rhythm.    Pulmonary/Chest: Effort normal and breath sounds normal. No respiratory distress.   Abdominal: Soft. She exhibits no distension. There is no tenderness (non tender to my examination). There is no rebound and no guarding.   Musculoskeletal: Normal range of motion.   Neurological: She is alert and oriented to person, place, and time.   Skin: Skin is warm.   Psychiatric:   Very anxious       Significant Labs:  CBC:   Recent Labs  Lab 05/12/17 2319   WBC 18.99*   RBC 4.72   HGB 14.4   HCT 40.5      MCV 86   MCH 30.5   MCHC 35.6     BMP:   Recent Labs  Lab 05/12/17 2319   *      K 3.4*      CO2 21*   BUN 18   CREATININE 1.0   CALCIUM 10.8*     LFTs:   Recent Labs  Lab 05/12/17 2319   ALT 13   AST 17   ALKPHOS 44*   BILITOT 0.8   PROT 8.3   ALBUMIN 4.8     LA: 4    Significant Diagnostics:  I have reviewed all pertinent imaging results/findings within the past 24 hours.   CT Abdomen / Pelvis:  Multiple loops of dilated small bowel, with a questionable transition zone in the midline lower abdomen, suggestive of  an  early distal small bowel obstruction.  No evidence of perforation.  There is a small amount of ascites surrounding the liver.  Multiple uterine fibroids.      Assessment/Plan:     * Small bowel obstruction  47 y.o. female with history of pelvic surgery and IBS now presenting with acute onset of crampy abdominal discomfort, nausea and emesis. CT consistent with likely adhesive SBO, partial by history and exam.     -Admit to surgery, floor  -NPO  -NG tube placement; LIWS  -Ambulate  -DVT ppx  -IV fluids  -Lactic acidosis and leukocytosis - possibly dehydration related; monitor, hydrate and repeat  -Serial exams    VTE Risk Mitigation         Ordered     enoxaparin injection 40 mg  Every 24 hours (non-standard times)     Route:  Subcutaneous        05/13/17 0218     Medium Risk of VTE  Once      05/13/17 0218     Place sequential compression device  Until discontinued      05/13/17 0218          Cesar Leonard MD  General Surgery  [facility]

## 2017-05-13 NOTE — NURSING
Patient transferred from the E.D. To Room 523A.  AAOx3.  No signs of cardiac or respiratory distress.  HOB elevated.  18 g S.L. To right AC.  NG tube to right nostril.  In no acute distress at this time.

## 2017-05-14 LAB
ANION GAP SERPL CALC-SCNC: 7 MMOL/L
BASOPHILS # BLD AUTO: 0.04 K/UL
BASOPHILS NFR BLD: 0.4 %
BUN SERPL-MCNC: 8 MG/DL
CALCIUM SERPL-MCNC: 8.6 MG/DL
CHLORIDE SERPL-SCNC: 107 MMOL/L
CO2 SERPL-SCNC: 28 MMOL/L
CREAT SERPL-MCNC: 0.8 MG/DL
DIFFERENTIAL METHOD: NORMAL
EOSINOPHIL # BLD AUTO: 0.2 K/UL
EOSINOPHIL NFR BLD: 2.5 %
ERYTHROCYTE [DISTWIDTH] IN BLOOD BY AUTOMATED COUNT: 13.6 %
EST. GFR  (AFRICAN AMERICAN): >60 ML/MIN/1.73 M^2
EST. GFR  (NON AFRICAN AMERICAN): >60 ML/MIN/1.73 M^2
GLUCOSE SERPL-MCNC: 88 MG/DL
HCT VFR BLD AUTO: 37.4 %
HGB BLD-MCNC: 12.5 G/DL
LYMPHOCYTES # BLD AUTO: 3.4 K/UL
LYMPHOCYTES NFR BLD: 35.7 %
MAGNESIUM SERPL-MCNC: 2.4 MG/DL
MCH RBC QN AUTO: 29.9 PG
MCHC RBC AUTO-ENTMCNC: 33.4 %
MCV RBC AUTO: 90 FL
MONOCYTES # BLD AUTO: 0.6 K/UL
MONOCYTES NFR BLD: 6.5 %
NEUTROPHILS # BLD AUTO: 5.2 K/UL
NEUTROPHILS NFR BLD: 54.7 %
PHOSPHATE SERPL-MCNC: 2.8 MG/DL
PLATELET # BLD AUTO: 260 K/UL
PMV BLD AUTO: 9.6 FL
POTASSIUM SERPL-SCNC: 4 MMOL/L
RBC # BLD AUTO: 4.18 M/UL
SODIUM SERPL-SCNC: 142 MMOL/L
WBC # BLD AUTO: 9.52 K/UL

## 2017-05-14 PROCEDURE — 85025 COMPLETE CBC W/AUTO DIFF WBC: CPT

## 2017-05-14 PROCEDURE — 80048 BASIC METABOLIC PNL TOTAL CA: CPT

## 2017-05-14 PROCEDURE — 99232 SBSQ HOSP IP/OBS MODERATE 35: CPT | Mod: ,,, | Performed by: SURGERY

## 2017-05-14 PROCEDURE — 11000001 HC ACUTE MED/SURG PRIVATE ROOM

## 2017-05-14 PROCEDURE — 63600175 PHARM REV CODE 636 W HCPCS: Performed by: STUDENT IN AN ORGANIZED HEALTH CARE EDUCATION/TRAINING PROGRAM

## 2017-05-14 PROCEDURE — 25000003 PHARM REV CODE 250: Performed by: SURGERY

## 2017-05-14 PROCEDURE — 84100 ASSAY OF PHOSPHORUS: CPT

## 2017-05-14 PROCEDURE — 36415 COLL VENOUS BLD VENIPUNCTURE: CPT

## 2017-05-14 PROCEDURE — 83735 ASSAY OF MAGNESIUM: CPT

## 2017-05-14 RX ORDER — ACETAMINOPHEN 10 MG/ML
1000 INJECTION, SOLUTION INTRAVENOUS EVERY 8 HOURS
Status: COMPLETED | OUTPATIENT
Start: 2017-05-14 | End: 2017-05-15

## 2017-05-14 RX ADMIN — ACETAMINOPHEN 1000 MG: 10 INJECTION, SOLUTION INTRAVENOUS at 12:05

## 2017-05-14 RX ADMIN — ACETAMINOPHEN 1000 MG: 10 INJECTION, SOLUTION INTRAVENOUS at 10:05

## 2017-05-14 RX ADMIN — Medication 3 ML: at 10:05

## 2017-05-14 RX ADMIN — Medication 3 ML: at 02:05

## 2017-05-14 NOTE — PLAN OF CARE
Problem: Patient Care Overview  Goal: Plan of Care Review  Outcome: Ongoing (interventions implemented as appropriate)   Pt AAOx3. No falls noted, fall precautions in place. No skin breakdown noted. Pt NPO. VSS. Will continue to monitor.

## 2017-05-14 NOTE — PROGRESS NOTES
Pt appears to be agitated and upset. She stated that she doesn't understand her diagnosis and why she is still hospitalized. She refuses to speak to any residents or interns. She strictly wants to speak to her attending physician. Nurse spoke with her about her plan of care while hospitalized and ensured her that she is in the best place to help her during this time.    MILI Solis

## 2017-05-14 NOTE — PROGRESS NOTES
[facility]  General Surgery  Progress Note    Subjective:     Interval History: Passed some gas overnight. No nausea or vomiting. AVSS. NGT output clearing.    Post-Op Info:  * No surgery found *          Medications:  Continuous Infusions:   sodium chloride 0.9% 125 mL/hr at 05/13/17 1421     Scheduled Meds:   enoxaparin  40 mg Subcutaneous Q24H    ketorolac  15 mg Intravenous Once    sodium chloride 0.9%  3 mL Intravenous Q8H     PRN Meds:benzocaine, lidocaine HCL 2%, morphine, morphine, ondansetron     Objective:     Vital Signs (Most Recent):  Temp: 98.1 °F (36.7 °C) (05/14/17 0819)  Pulse: (!) 58 (05/14/17 0819)  Resp: 18 (05/14/17 0819)  BP: 138/63 (05/14/17 0819)  SpO2: 98 % (05/14/17 0819) Vital Signs (24h Range):  Temp:  [97.5 °F (36.4 °C)-99.3 °F (37.4 °C)] 98.1 °F (36.7 °C)  Pulse:  [58-69] 58  Resp:  [16-18] 18  SpO2:  [97 %-100 %] 98 %  BP: (131-144)/(54-68) 138/63       Intake/Output Summary (Last 24 hours) at 05/14/17 1019  Last data filed at 05/14/17 0500   Gross per 24 hour   Intake             1827 ml   Output              250 ml   Net             1577 ml       Physical Exam  NAD  RRR  CTAB  Abd soft, ND/NT, hypo bowel sounds      Significant Labs:  CBC:   Recent Labs  Lab 05/14/17  0440   WBC 9.52   RBC 4.18   HGB 12.5   HCT 37.4      MCV 90   MCH 29.9   MCHC 33.4     CMP:   Recent Labs  Lab 05/12/17  2319  05/14/17  0440   *  < > 88   CALCIUM 10.8*  < > 8.6*   ALBUMIN 4.8  --   --    PROT 8.3  --   --      < > 142   K 3.4*  < > 4.0   CO2 21*  < > 28     < > 107   BUN 18  < > 8   CREATININE 1.0  < > 0.8   ALKPHOS 44*  --   --    ALT 13  --   --    AST 17  --   --    BILITOT 0.8  --   --    < > = values in this interval not displayed.    Assessment/Plan:   46 yo F with pSBO, potentially resolving  -Continue NGT, may DC in am if continues to be low  -NPO  -Ambulate  -serial abd exams    Active Diagnoses:    Diagnosis Date Noted POA     PRINCIPAL PROBLEM:  Small bowel obstruction [K56.69] 05/13/2017 Yes      Problems Resolved During this Admission:    Diagnosis Date Noted Date Resolved CHRISTIANOA         David Ortiz MD  General Surgery  [facility]

## 2017-05-15 PROBLEM — E87.6 HYPOKALEMIA: Status: ACTIVE | Noted: 2017-05-15

## 2017-05-15 LAB
ANION GAP SERPL CALC-SCNC: 13 MMOL/L
BASOPHILS # BLD AUTO: 0.04 K/UL
BASOPHILS NFR BLD: 0.5 %
BUN SERPL-MCNC: 13 MG/DL
CALCIUM SERPL-MCNC: 8.6 MG/DL
CHLORIDE SERPL-SCNC: 105 MMOL/L
CO2 SERPL-SCNC: 21 MMOL/L
CREAT SERPL-MCNC: 0.7 MG/DL
DIFFERENTIAL METHOD: ABNORMAL
EOSINOPHIL # BLD AUTO: 0.2 K/UL
EOSINOPHIL NFR BLD: 1.8 %
ERYTHROCYTE [DISTWIDTH] IN BLOOD BY AUTOMATED COUNT: 13.2 %
EST. GFR  (AFRICAN AMERICAN): >60 ML/MIN/1.73 M^2
EST. GFR  (NON AFRICAN AMERICAN): >60 ML/MIN/1.73 M^2
GLUCOSE SERPL-MCNC: 60 MG/DL
HCT VFR BLD AUTO: 34.8 %
HGB BLD-MCNC: 11.8 G/DL
LYMPHOCYTES # BLD AUTO: 2.3 K/UL
LYMPHOCYTES NFR BLD: 28.3 %
MAGNESIUM SERPL-MCNC: 2 MG/DL
MCH RBC QN AUTO: 29.9 PG
MCHC RBC AUTO-ENTMCNC: 33.9 %
MCV RBC AUTO: 88 FL
MONOCYTES # BLD AUTO: 0.5 K/UL
MONOCYTES NFR BLD: 5.9 %
NEUTROPHILS # BLD AUTO: 5.2 K/UL
NEUTROPHILS NFR BLD: 63.4 %
PHOSPHATE SERPL-MCNC: 3.2 MG/DL
PLATELET # BLD AUTO: 239 K/UL
PMV BLD AUTO: 9.8 FL
POTASSIUM SERPL-SCNC: 3.8 MMOL/L
RBC # BLD AUTO: 3.94 M/UL
SODIUM SERPL-SCNC: 139 MMOL/L
WBC # BLD AUTO: 8.14 K/UL

## 2017-05-15 PROCEDURE — 36415 COLL VENOUS BLD VENIPUNCTURE: CPT

## 2017-05-15 PROCEDURE — 63600175 PHARM REV CODE 636 W HCPCS: Performed by: STUDENT IN AN ORGANIZED HEALTH CARE EDUCATION/TRAINING PROGRAM

## 2017-05-15 PROCEDURE — 11000001 HC ACUTE MED/SURG PRIVATE ROOM

## 2017-05-15 PROCEDURE — 99232 SBSQ HOSP IP/OBS MODERATE 35: CPT | Mod: ,,, | Performed by: SURGERY

## 2017-05-15 PROCEDURE — 84100 ASSAY OF PHOSPHORUS: CPT

## 2017-05-15 PROCEDURE — 63600175 PHARM REV CODE 636 W HCPCS: Performed by: SURGERY

## 2017-05-15 PROCEDURE — 85025 COMPLETE CBC W/AUTO DIFF WBC: CPT

## 2017-05-15 PROCEDURE — 25000003 PHARM REV CODE 250: Performed by: SURGERY

## 2017-05-15 PROCEDURE — 83735 ASSAY OF MAGNESIUM: CPT

## 2017-05-15 PROCEDURE — 80048 BASIC METABOLIC PNL TOTAL CA: CPT

## 2017-05-15 PROCEDURE — 63600175 PHARM REV CODE 636 W HCPCS: Performed by: PHYSICIAN ASSISTANT

## 2017-05-15 RX ORDER — POTASSIUM CHLORIDE 7.45 MG/ML
10 INJECTION INTRAVENOUS
Status: COMPLETED | OUTPATIENT
Start: 2017-05-15 | End: 2017-05-15

## 2017-05-15 RX ADMIN — ENOXAPARIN SODIUM 40 MG: 100 INJECTION SUBCUTANEOUS at 05:05

## 2017-05-15 RX ADMIN — POTASSIUM CHLORIDE 10 MEQ: 10 INJECTION, SOLUTION INTRAVENOUS at 12:05

## 2017-05-15 RX ADMIN — ACETAMINOPHEN 1000 MG: 10 INJECTION, SOLUTION INTRAVENOUS at 05:05

## 2017-05-15 RX ADMIN — SODIUM CHLORIDE: 0.9 INJECTION, SOLUTION INTRAVENOUS at 12:05

## 2017-05-15 RX ADMIN — Medication 3 ML: at 02:05

## 2017-05-15 RX ADMIN — POTASSIUM CHLORIDE 10 MEQ: 10 INJECTION, SOLUTION INTRAVENOUS at 01:05

## 2017-05-15 NOTE — SUBJECTIVE & OBJECTIVE
Interval History:   Patient seen and examined, no acute events overnight, patient denies nausea or vomiting, states pain is manageable; states she is passing flatus, no BM    Medications:  Continuous Infusions:   sodium chloride 0.9% 125 mL/hr at 05/13/17 1421     Scheduled Meds:   enoxaparin  40 mg Subcutaneous Q24H    ketorolac  15 mg Intravenous Once    sodium chloride 0.9%  3 mL Intravenous Q8H     PRN Meds:benzocaine, lidocaine HCL 2%, ondansetron     Review of patient's allergies indicates:  No Known Allergies  Objective:     Vital Signs (Most Recent):  Temp: 97.8 °F (36.6 °C) (05/15/17 0718)  Pulse: 64 (05/15/17 0718)  Resp: 18 (05/15/17 0718)  BP: (!) 116/58 (05/15/17 0718)  SpO2: 95 % (05/15/17 0718) Vital Signs (24h Range):  Temp:  [97.8 °F (36.6 °C)-98.5 °F (36.9 °C)] 97.8 °F (36.6 °C)  Pulse:  [56-64] 64  Resp:  [16-18] 18  SpO2:  [95 %-100 %] 95 %  BP: (116-141)/(58-66) 116/58     Weight: 50.8 kg (112 lb)  Body mass index is 19.22 kg/(m^2).    Intake/Output - Last 3 Shifts       05/13 0700 - 05/14 0659 05/14 0700 - 05/15 0659 05/15 0700 - 05/16 0659    P.O. 0 0     I.V. (mL/kg) 1827 (36) 1356 (26.7)     Total Intake(mL/kg) 1827 (36) 1356 (26.7)     Urine (mL/kg/hr) 0 (0) 0 (0)     Emesis/NG output 0 (0) 0 (0)     Drains 250 (0.2) 750 (0.6)     Stool 0 (0) 0 (0)     Blood  0 (0)     Total Output 250 750      Net +1577 +606             Urine Occurrence 7 x 5 x     Stool Occurrence 0 x 0 x 0 x    Emesis Occurrence 0 x 0 x           Physical Exam   Constitutional: She is oriented to person, place, and time. She appears well-developed and well-nourished. No distress.   HENT:   Head: Normocephalic and atraumatic.   Cardiovascular: Normal rate and regular rhythm.    Pulmonary/Chest: Effort normal. No respiratory distress.   Abdominal:   Soft, minimally TTP, no peritoneal signs   Neurological: She is alert and oriented to person, place, and time.       Significant Labs:  CBC:   Recent Labs  Lab  05/15/17  0411   WBC 8.14   RBC 3.94*   HGB 11.8*   HCT 34.8*      MCV 88   MCH 29.9   MCHC 33.9     BMP:   Recent Labs  Lab 05/15/17  0411   GLU 60*      K 3.8      CO2 21*   BUN 13   CREATININE 0.7   CALCIUM 8.6*   MG 2.0     CMP:   Recent Labs  Lab 05/12/17  2319  05/15/17  0411   *  < > 60*   CALCIUM 10.8*  < > 8.6*   ALBUMIN 4.8  --   --    PROT 8.3  --   --      < > 139   K 3.4*  < > 3.8   CO2 21*  < > 21*     < > 105   BUN 18  < > 13   CREATININE 1.0  < > 0.7   ALKPHOS 44*  --   --    ALT 13  --   --    AST 17  --   --    BILITOT 0.8  --   --    < > = values in this interval not displayed.  LFTs:   Recent Labs  Lab 05/12/17 2319   ALT 13   AST 17   ALKPHOS 44*   BILITOT 0.8   PROT 8.3   ALBUMIN 4.8

## 2017-05-15 NOTE — PROGRESS NOTES
[facility]  General Surgery  Progress Note    Subjective:     History of Present Illness:  47 y.o. female presenting with crampy severe mid/lower abdominal discomfort starting this evening around 5pm associated with multiple bouts of nausea and emesis. Has previously had a large fibroid removed via lower transverse incision about 10 years ago. Feeling better following pain and nausea medications but states she can still feel cramps. No fevers, chills, CP or SOB. No prior SBO. Last BM yesterday. Continues to pass flatus and some burping today.  Does have a history of self diagnosed IBS (mom with IBS) and lactose intolerance. Chronically with sporadic abdominal discomfort, cramps and diarrhea but this episode is different.      Post-Op Info:  * No surgery found *         Interval History:   Patient seen and examined, no acute events overnight, patient denies nausea or vomiting, states pain is manageable; states she is passing flatus, no BM    Medications:  Continuous Infusions:   sodium chloride 0.9% 125 mL/hr at 05/13/17 1421     Scheduled Meds:   enoxaparin  40 mg Subcutaneous Q24H    ketorolac  15 mg Intravenous Once    sodium chloride 0.9%  3 mL Intravenous Q8H     PRN Meds:benzocaine, lidocaine HCL 2%, ondansetron     Review of patient's allergies indicates:  No Known Allergies  Objective:     Vital Signs (Most Recent):  Temp: 97.8 °F (36.6 °C) (05/15/17 0718)  Pulse: 64 (05/15/17 0718)  Resp: 18 (05/15/17 0718)  BP: (!) 116/58 (05/15/17 0718)  SpO2: 95 % (05/15/17 0718) Vital Signs (24h Range):  Temp:  [97.8 °F (36.6 °C)-98.5 °F (36.9 °C)] 97.8 °F (36.6 °C)  Pulse:  [56-64] 64  Resp:  [16-18] 18  SpO2:  [95 %-100 %] 95 %  BP: (116-141)/(58-66) 116/58     Weight: 50.8 kg (112 lb)  Body mass index is 19.22 kg/(m^2).    Intake/Output - Last 3 Shifts       05/13 0700 - 05/14 0659 05/14 0700 - 05/15 0659 05/15 0700 - 05/16 0659    P.O. 0 0     I.V. (mL/kg) 1827 (36) 1356 (26.7)     Total  Intake(mL/kg) 1827 (36) 1356 (26.7)     Urine (mL/kg/hr) 0 (0) 0 (0)     Emesis/NG output 0 (0) 0 (0)     Drains 250 (0.2) 750 (0.6)     Stool 0 (0) 0 (0)     Blood  0 (0)     Total Output 250 750      Net +1577 +606             Urine Occurrence 7 x 5 x     Stool Occurrence 0 x 0 x 0 x    Emesis Occurrence 0 x 0 x           Physical Exam   Constitutional: She is oriented to person, place, and time. She appears well-developed and well-nourished. No distress.   HENT:   Head: Normocephalic and atraumatic.   Cardiovascular: Normal rate and regular rhythm.    Pulmonary/Chest: Effort normal. No respiratory distress.   Abdominal:   Soft, minimally TTP, no peritoneal signs   Neurological: She is alert and oriented to person, place, and time.       Significant Labs:  CBC:   Recent Labs  Lab 05/15/17  0411   WBC 8.14   RBC 3.94*   HGB 11.8*   HCT 34.8*      MCV 88   MCH 29.9   MCHC 33.9     BMP:   Recent Labs  Lab 05/15/17  0411   GLU 60*      K 3.8      CO2 21*   BUN 13   CREATININE 0.7   CALCIUM 8.6*   MG 2.0     CMP:   Recent Labs  Lab 05/12/17 2319  05/15/17  0411   *  < > 60*   CALCIUM 10.8*  < > 8.6*   ALBUMIN 4.8  --   --    PROT 8.3  --   --      < > 139   K 3.4*  < > 3.8   CO2 21*  < > 21*     < > 105   BUN 18  < > 13   CREATININE 1.0  < > 0.7   ALKPHOS 44*  --   --    ALT 13  --   --    AST 17  --   --    BILITOT 0.8  --   --    < > = values in this interval not displayed.  LFTs:   Recent Labs  Lab 05/12/17  2319   ALT 13   AST 17   ALKPHOS 44*   BILITOT 0.8   PROT 8.3   ALBUMIN 4.8     Assessment/Plan:     * Small bowel obstruction  47 y.o. female with history of pelvic surgery and IBS now presenting with acute onset of crampy abdominal discomfort, nausea and emesis. CT consistent with likely adhesive SBO, partial by history and exam.     -d/c NGT  -advance to CLD  -pain/nausea meds PRN      Hypokalemia  replace      KERI HoffmanC   [id number]  General Surgery  [facility]

## 2017-05-15 NOTE — ASSESSMENT & PLAN NOTE
47 y.o. female with history of pelvic surgery and IBS now presenting with acute onset of crampy abdominal discomfort, nausea and emesis. CT consistent with likely adhesive SBO, partial by history and exam.     -d/c NGT  -advance to CLD  -pain/nausea meds PRN

## 2017-05-15 NOTE — PLAN OF CARE
Patient lives in a raised (3 [address] w/railing) single story house w/friend. Not medically stable to discharge;DX: SBO, NGT removed, diet advanced to clears. No needs determined. CM will continue to follow.     Patient doesn't have a PCP. Her GYN  is listed.     [facility] My Health Packet given to patient after informed about it;patient verbalized their understanding.        05/15/17 1305   Discharge Assessment   Assessment Type Discharge Planning Assessment   Confirmed/corrected address and phone number on facesheet? Yes   Assessment information obtained from? Patient;Medical Record   Expected Length of Stay (days) (3-4+)   Communicated expected length of stay with patient/caregiver no  (per MD)   Type of Healthcare Directive Received (Unknown)   Prior to hospitilization cognitive status: Alert/Oriented;No Deficits   Prior to hospitalization functional status: Independent   Current cognitive status: Alert/Oriented;No Deficits   Current Functional Status: Independent   Arrived From home or self-care  (Via ER)   Lives With friend(s)  (girlfriend, Neha)   Able to Return to Prior Arrangements yes   Is patient able to care for self after discharge? Yes   How many people do you have in your home that can help with your care after discharge? 1   Who are your caregiver(s) and their phone number(s)? (Girlfriend: Neha BASS [phone]. )   Patient's perception of discharge disposition home or selfcare   Readmission Within The Last 30 Days no previous admission in last 30 days   Patient currently being followed by outpatient case management? No   Patient currently receives home health services? No   Does the patient currently use HME? No   Patient currently receives private duty nursing? N/A   Patient currently receives any other outside agency services? No   Equipment Currently Used at Home none   Do you have any problems affording any of your prescribed medications? No   Is the patient taking medications as prescribed?  yes   Do you have any financial concerns preventing you from receiving the healthcare you need? No   Does the patient have transportation to healthcare appointments? Yes   Transportation Available car;family or friend will provide   On Dialysis? No   Does the patient receive services at the Coumadin Clinic? No   Are there any open cases? No   Discharge Plan A Home  (w/friend)   Discharge Plan B Home  (as above)   Patient/Family In Agreement With Plan yes

## 2017-05-15 NOTE — PLAN OF CARE
Problem: Patient Care Overview  Goal: Plan of Care Review  Outcome: Ongoing (interventions implemented as appropriate)  Plan of care reviewed with patient with no questions or concerns at this time. Patient is oriented x4. Pain was assessed and managed throughout shift. IV sites intact with continuous fluids infusing during shift. NGT to right nare intact and to low intermittent wall suction. Patient ambulates in halls throughout shift. Fall precautions in place. SCD's in place. Will continue to monitor.

## 2017-05-16 LAB
ANION GAP SERPL CALC-SCNC: 9 MMOL/L
BASOPHILS # BLD AUTO: 0.04 K/UL
BASOPHILS NFR BLD: 0.5 %
BUN SERPL-MCNC: 7 MG/DL
CALCIUM SERPL-MCNC: 9.3 MG/DL
CHLORIDE SERPL-SCNC: 106 MMOL/L
CO2 SERPL-SCNC: 24 MMOL/L
CREAT SERPL-MCNC: 0.7 MG/DL
DIFFERENTIAL METHOD: ABNORMAL
EOSINOPHIL # BLD AUTO: 0.3 K/UL
EOSINOPHIL NFR BLD: 3.6 %
ERYTHROCYTE [DISTWIDTH] IN BLOOD BY AUTOMATED COUNT: 12.8 %
EST. GFR  (AFRICAN AMERICAN): >60 ML/MIN/1.73 M^2
EST. GFR  (NON AFRICAN AMERICAN): >60 ML/MIN/1.73 M^2
GLUCOSE SERPL-MCNC: 86 MG/DL
HCT VFR BLD AUTO: 35.7 %
HGB BLD-MCNC: 12.2 G/DL
LYMPHOCYTES # BLD AUTO: 2.2 K/UL
LYMPHOCYTES NFR BLD: 29.8 %
MAGNESIUM SERPL-MCNC: 2 MG/DL
MCH RBC QN AUTO: 29.6 PG
MCHC RBC AUTO-ENTMCNC: 34.2 %
MCV RBC AUTO: 87 FL
MONOCYTES # BLD AUTO: 0.6 K/UL
MONOCYTES NFR BLD: 8.6 %
NEUTROPHILS # BLD AUTO: 4.3 K/UL
NEUTROPHILS NFR BLD: 57.2 %
PHOSPHATE SERPL-MCNC: 3 MG/DL
PLATELET # BLD AUTO: 242 K/UL
PMV BLD AUTO: 9.5 FL
POTASSIUM SERPL-SCNC: 4.1 MMOL/L
RBC # BLD AUTO: 4.12 M/UL
SODIUM SERPL-SCNC: 139 MMOL/L
WBC # BLD AUTO: 7.44 K/UL

## 2017-05-16 PROCEDURE — 36415 COLL VENOUS BLD VENIPUNCTURE: CPT

## 2017-05-16 PROCEDURE — 25000003 PHARM REV CODE 250: Performed by: STUDENT IN AN ORGANIZED HEALTH CARE EDUCATION/TRAINING PROGRAM

## 2017-05-16 PROCEDURE — 11000001 HC ACUTE MED/SURG PRIVATE ROOM

## 2017-05-16 PROCEDURE — 85025 COMPLETE CBC W/AUTO DIFF WBC: CPT

## 2017-05-16 PROCEDURE — 25000003 PHARM REV CODE 250: Performed by: SURGERY

## 2017-05-16 PROCEDURE — 80048 BASIC METABOLIC PNL TOTAL CA: CPT

## 2017-05-16 PROCEDURE — 83735 ASSAY OF MAGNESIUM: CPT

## 2017-05-16 PROCEDURE — 99232 SBSQ HOSP IP/OBS MODERATE 35: CPT | Mod: ,,, | Performed by: SURGERY

## 2017-05-16 PROCEDURE — 25000003 PHARM REV CODE 250: Performed by: PHYSICIAN ASSISTANT

## 2017-05-16 PROCEDURE — 84100 ASSAY OF PHOSPHORUS: CPT

## 2017-05-16 RX ORDER — BISACODYL 10 MG
10 SUPPOSITORY, RECTAL RECTAL ONCE
Status: DISCONTINUED | OUTPATIENT
Start: 2017-05-16 | End: 2017-05-17 | Stop reason: HOSPADM

## 2017-05-16 RX ORDER — POLYETHYLENE GLYCOL 3350 17 G/17G
17 POWDER, FOR SOLUTION ORAL DAILY
Status: DISCONTINUED | OUTPATIENT
Start: 2017-05-16 | End: 2017-05-17 | Stop reason: HOSPADM

## 2017-05-16 RX ORDER — BUTALBITAL, ACETAMINOPHEN AND CAFFEINE 50; 325; 40 MG/1; MG/1; MG/1
1 TABLET ORAL EVERY 4 HOURS PRN
Status: DISCONTINUED | OUTPATIENT
Start: 2017-05-16 | End: 2017-05-17 | Stop reason: HOSPADM

## 2017-05-16 RX ADMIN — Medication 3 ML: at 06:05

## 2017-05-16 RX ADMIN — Medication 3 ML: at 01:05

## 2017-05-16 RX ADMIN — POLYETHYLENE GLYCOL 3350 17 G: 17 POWDER, FOR SOLUTION ORAL at 10:05

## 2017-05-16 RX ADMIN — BUTALBITAL, ACETAMINOPHEN AND CAFFEINE 1 TABLET: 50; 325; 40 TABLET ORAL at 10:05

## 2017-05-16 RX ADMIN — Medication 1 CAPSULE: at 10:05

## 2017-05-16 RX ADMIN — DOCUSATE SODIUM 50 MG: 50 CAPSULE, LIQUID FILLED ORAL at 10:05

## 2017-05-16 NOTE — ASSESSMENT & PLAN NOTE
47 y.o. female with history of pelvic surgery and IBS now presenting with acute onset of crampy abdominal discomfort, nausea and emesis. CT consistent with likely adhesive SBO, partial by history and exam.     -Regular diet  -bowel regimen  -if darwin diet and has BM will d/c today

## 2017-05-16 NOTE — PROGRESS NOTES
Pt reports having a BM prior to suppository administration. Surgery team paged to inform. Will cont to monitor.

## 2017-05-16 NOTE — PLAN OF CARE
Problem: Patient Care Overview  Goal: Plan of Care Review  Outcome: Ongoing (interventions implemented as appropriate)  Patient progressing with POC. Pain denied throughout shift. No complaints of nausea during shift. Vital signs stable. Afebrile. Patient refused SCDS and verbalizes understanding of VTE refusal. Safety and fall precautions active. Call light in reach. Will continue to monitor per frequent rounding.

## 2017-05-16 NOTE — PLAN OF CARE
Problem: Patient Care Overview  Goal: Plan of Care Review  Outcome: Ongoing (interventions implemented as appropriate)  Pt aaox4, vs stable, no falls or injuries. Ambulated in jacob and to the bathroom. Pain level being monitor and control by medication. No signs of infection or distress. IV's patent. Call light in reach; will continue to monitor pt.

## 2017-05-16 NOTE — PROGRESS NOTES
[facility]  General Surgery  Progress Note    Subjective:     History of Present Illness:  47 y.o. female presenting with crampy severe mid/lower abdominal discomfort starting this evening around 5pm associated with multiple bouts of nausea and emesis. Has previously had a large fibroid removed via lower transverse incision about 10 years ago. Feeling better following pain and nausea medications but states she can still feel cramps. No fevers, chills, CP or SOB. No prior SBO. Last BM yesterday. Continues to pass flatus and some burping today.  Does have a history of self diagnosed IBS (mom with IBS) and lactose intolerance. Chronically with sporadic abdominal discomfort, cramps and diarrhea but this episode is different.      Post-Op Info:  * No surgery found *         Interval History: Alex clears. +flatus, no BM. No abd pain.     Medications:  Continuous Infusions:   sodium chloride 0.9% 125 mL/hr at 05/15/17 1216     Scheduled Meds:   docusate sodium  50 mg Oral Daily    enoxaparin  40 mg Subcutaneous Q24H    ketorolac  15 mg Intravenous Once    polyethylene glycol  17 g Oral Daily    sodium chloride 0.9%  3 mL Intravenous Q8H     PRN Meds:benzocaine, butalbital-acetaminophen-caffeine -40 mg, lidocaine HCL 2%, ondansetron     Review of patient's allergies indicates:  No Known Allergies  Objective:     Vital Signs (Most Recent):  Temp: 98.3 °F (36.8 °C) (05/16/17 0400)  Pulse: (!) 51 (05/16/17 0400)  Resp: 20 (05/16/17 0400)  BP: 127/67 (05/16/17 0400)  SpO2: 98 % (05/16/17 0400) Vital Signs (24h Range):  Temp:  [97.8 °F (36.6 °C)-98.9 °F (37.2 °C)] 98.3 °F (36.8 °C)  Pulse:  [51-73] 51  Resp:  [16-20] 20  SpO2:  [95 %-100 %] 98 %  BP: (116-143)/(58-71) 127/67     Weight: 50.8 kg (112 lb)  Body mass index is 19.22 kg/(m^2).    Intake/Output - Last 3 Shifts       05/14 0700 - 05/15 0659 05/15 0700 - 05/16 0659 05/16 0700 - 05/17 0659    P.O. 0 668     I.V. (mL/kg) 1356 (26.7)       Total Intake(mL/kg) 1356 (26.7) 668 (13.1)     Urine (mL/kg/hr) 0 (0)      Emesis/NG output 0 (0)      Drains 750 (0.6)      Stool 0 (0)      Blood 0 (0)      Total Output 750        Net +606 +668             Urine Occurrence 5 x 3 x     Stool Occurrence 0 x 0 x     Emesis Occurrence 0 x 0 x           Physical Exam   Constitutional: She is oriented to person, place, and time. She appears well-developed and well-nourished. No distress.   HENT:   Head: Normocephalic and atraumatic.   Cardiovascular: Normal rate and regular rhythm.    Pulmonary/Chest: Effort normal. No respiratory distress.   Abdominal:   Soft, nontender to palpation, no peritoneal signs, improved distention   Neurological: She is alert and oriented to person, place, and time.       Significant Labs:  CBC:   Recent Labs  Lab 05/16/17  0457   WBC 7.44   RBC 4.12   HGB 12.2   HCT 35.7*      MCV 87   MCH 29.6   MCHC 34.2     BMP:   Recent Labs  Lab 05/16/17  0457   GLU 86      K 4.1      CO2 24   BUN 7   CREATININE 0.7   CALCIUM 9.3   MG 2.0       Significant Diagnostics:  No new imaging    Assessment/Plan:     * Small bowel obstruction  47 y.o. female with history of pelvic surgery and IBS now presenting with acute onset of crampy abdominal discomfort, nausea and emesis. CT consistent with likely adhesive SBO, partial by history and exam.     -Regular diet  -bowel regimen  -if darwin diet and has BM will d/c today        Geni Shelby MD  General Surgery  [facility]

## 2017-05-16 NOTE — SUBJECTIVE & OBJECTIVE
Interval History: Alex clears. +flatus, no BM. No abd pain.     Medications:  Continuous Infusions:   sodium chloride 0.9% 125 mL/hr at 05/15/17 1216     Scheduled Meds:   docusate sodium  50 mg Oral Daily    enoxaparin  40 mg Subcutaneous Q24H    ketorolac  15 mg Intravenous Once    polyethylene glycol  17 g Oral Daily    sodium chloride 0.9%  3 mL Intravenous Q8H     PRN Meds:benzocaine, butalbital-acetaminophen-caffeine -40 mg, lidocaine HCL 2%, ondansetron     Review of patient's allergies indicates:  No Known Allergies  Objective:     Vital Signs (Most Recent):  Temp: 98.3 °F (36.8 °C) (05/16/17 0400)  Pulse: (!) 51 (05/16/17 0400)  Resp: 20 (05/16/17 0400)  BP: 127/67 (05/16/17 0400)  SpO2: 98 % (05/16/17 0400) Vital Signs (24h Range):  Temp:  [97.8 °F (36.6 °C)-98.9 °F (37.2 °C)] 98.3 °F (36.8 °C)  Pulse:  [51-73] 51  Resp:  [16-20] 20  SpO2:  [95 %-100 %] 98 %  BP: (116-143)/(58-71) 127/67     Weight: 50.8 kg (112 lb)  Body mass index is 19.22 kg/(m^2).    Intake/Output - Last 3 Shifts       05/14 0700 - 05/15 0659 05/15 0700 - 05/16 0659 05/16 0700 - 05/17 0659    P.O. 0 668     I.V. (mL/kg) 1356 (26.7)      Total Intake(mL/kg) 1356 (26.7) 668 (13.1)     Urine (mL/kg/hr) 0 (0)      Emesis/NG output 0 (0)      Drains 750 (0.6)      Stool 0 (0)      Blood 0 (0)      Total Output 750        Net +606 +668             Urine Occurrence 5 x 3 x     Stool Occurrence 0 x 0 x     Emesis Occurrence 0 x 0 x           Physical Exam   Constitutional: She is oriented to person, place, and time. She appears well-developed and well-nourished. No distress.   HENT:   Head: Normocephalic and atraumatic.   Cardiovascular: Normal rate and regular rhythm.    Pulmonary/Chest: Effort normal. No respiratory distress.   Abdominal:   Soft, nontender to palpation, no peritoneal signs, improved distention   Neurological: She is alert and oriented to person, place, and time.       Significant Labs:  CBC:   Recent Labs  Lab  05/16/17 0457   WBC 7.44   RBC 4.12   HGB 12.2   HCT 35.7*      MCV 87   MCH 29.6   MCHC 34.2     BMP:   Recent Labs  Lab 05/16/17 0457   GLU 86      K 4.1      CO2 24   BUN 7   CREATININE 0.7   CALCIUM 9.3   MG 2.0       Significant Diagnostics:  No new imaging

## 2017-05-17 VITALS
OXYGEN SATURATION: 98 % | DIASTOLIC BLOOD PRESSURE: 69 MMHG | WEIGHT: 112 LBS | SYSTOLIC BLOOD PRESSURE: 144 MMHG | RESPIRATION RATE: 18 BRPM | HEART RATE: 60 BPM | BODY MASS INDEX: 19.12 KG/M2 | TEMPERATURE: 99 F | HEIGHT: 64 IN

## 2017-05-17 LAB
ANION GAP SERPL CALC-SCNC: 10 MMOL/L
BASOPHILS # BLD AUTO: 0.08 K/UL
BASOPHILS NFR BLD: 1 %
BUN SERPL-MCNC: 10 MG/DL
CALCIUM SERPL-MCNC: 9.8 MG/DL
CHLORIDE SERPL-SCNC: 104 MMOL/L
CO2 SERPL-SCNC: 28 MMOL/L
CREAT SERPL-MCNC: 0.8 MG/DL
DIFFERENTIAL METHOD: NORMAL
EOSINOPHIL # BLD AUTO: 0.3 K/UL
EOSINOPHIL NFR BLD: 3 %
ERYTHROCYTE [DISTWIDTH] IN BLOOD BY AUTOMATED COUNT: 12.8 %
EST. GFR  (AFRICAN AMERICAN): >60 ML/MIN/1.73 M^2
EST. GFR  (NON AFRICAN AMERICAN): >60 ML/MIN/1.73 M^2
GLUCOSE SERPL-MCNC: 111 MG/DL
HCT VFR BLD AUTO: 39.7 %
HGB BLD-MCNC: 13 G/DL
LYMPHOCYTES # BLD AUTO: 3.2 K/UL
LYMPHOCYTES NFR BLD: 38.5 %
MAGNESIUM SERPL-MCNC: 2.2 MG/DL
MCH RBC QN AUTO: 29.3 PG
MCHC RBC AUTO-ENTMCNC: 32.7 %
MCV RBC AUTO: 89 FL
MONOCYTES # BLD AUTO: 0.7 K/UL
MONOCYTES NFR BLD: 8.1 %
NEUTROPHILS # BLD AUTO: 4.1 K/UL
NEUTROPHILS NFR BLD: 49.3 %
PHOSPHATE SERPL-MCNC: 3.6 MG/DL
PLATELET # BLD AUTO: 294 K/UL
PMV BLD AUTO: 10 FL
POTASSIUM SERPL-SCNC: 3.8 MMOL/L
RBC # BLD AUTO: 4.44 M/UL
SODIUM SERPL-SCNC: 142 MMOL/L
WBC # BLD AUTO: 8.24 K/UL

## 2017-05-17 PROCEDURE — 99232 SBSQ HOSP IP/OBS MODERATE 35: CPT | Mod: ,,, | Performed by: SURGERY

## 2017-05-17 PROCEDURE — 85025 COMPLETE CBC W/AUTO DIFF WBC: CPT

## 2017-05-17 PROCEDURE — 25000003 PHARM REV CODE 250: Performed by: PHYSICIAN ASSISTANT

## 2017-05-17 PROCEDURE — 80048 BASIC METABOLIC PNL TOTAL CA: CPT

## 2017-05-17 PROCEDURE — 84100 ASSAY OF PHOSPHORUS: CPT

## 2017-05-17 PROCEDURE — 83735 ASSAY OF MAGNESIUM: CPT

## 2017-05-17 PROCEDURE — 25000003 PHARM REV CODE 250: Performed by: SURGERY

## 2017-05-17 PROCEDURE — 36415 COLL VENOUS BLD VENIPUNCTURE: CPT

## 2017-05-17 RX ORDER — ONDANSETRON 4 MG/1
4 TABLET, ORALLY DISINTEGRATING ORAL EVERY 8 HOURS PRN
Qty: 20 TABLET | Refills: 0 | Status: SHIPPED | OUTPATIENT
Start: 2017-05-17 | End: 2019-10-24

## 2017-05-17 RX ORDER — POLYETHYLENE GLYCOL 3350 17 G/17G
17 POWDER, FOR SOLUTION ORAL DAILY
Refills: 0
Start: 2017-05-17 | End: 2017-10-03

## 2017-05-17 RX ADMIN — Medication 1 CAPSULE: at 08:05

## 2017-05-17 RX ADMIN — POLYETHYLENE GLYCOL 3350 17 G: 17 POWDER, FOR SOLUTION ORAL at 08:05

## 2017-05-17 RX ADMIN — DOCUSATE SODIUM 50 MG: 50 CAPSULE, LIQUID FILLED ORAL at 08:05

## 2017-05-17 RX ADMIN — Medication 3 ML: at 06:05

## 2017-05-17 NOTE — PLAN OF CARE
Problem: Patient Care Overview  Goal: Plan of Care Review  Outcome: Ongoing (interventions implemented as appropriate)  Patient progressing with POC. Pain denied throughout shift. Vital signs stable. Afebrile. Safety and fall precautions active. Call light in reach. Will continue to monitor per frequent rounding.

## 2017-05-17 NOTE — ASSESSMENT & PLAN NOTE
47 y.o. female with history of pelvic surgery and IBS now presenting with acute onset of crampy abdominal discomfort, nausea and emesis. CT consistent with likely adhesive SBO, partial by history and exam.     -Continue Regular diet  -Continue bowel regimen  -plan for d/c today

## 2017-05-17 NOTE — DISCHARGE SUMMARY
[facility]  General Surgery  Discharge Summary      Patient Name: Jennifer Caballero  MRN: [id number]  Admission Date: 5/12/2017  Hospital Length of Stay: 4 days  Discharge Date and Time:  05/17/2017 8:35 AM  Attending Physician: Baldemar Vasquez MD   Discharging Provider: Maria Eugenia Jim PA-C  Primary Care Provider: Luis Lopez MD    HPI:   47 y.o. female presenting with crampy severe mid/lower abdominal discomfort starting this evening around 5pm associated with multiple bouts of nausea and emesis. Has previously had a large fibroid removed via lower transverse incision about 10 years ago. Feeling better following pain and nausea medications but states she can still feel cramps. No fevers, chills, CP or SOB. No prior SBO. Last BM yesterday. Continues to pass flatus and some burping today.  Does have a history of self diagnosed IBS (mom with IBS) and lactose intolerance. Chronically with sporadic abdominal discomfort, cramps and diarrhea but this episode is different.      * No surgery found *      Indwelling Lines/Drains at time of discharge:   Lines/Drains/Airways          No matching active lines, drains, or airways        Hospital Course:    Patient presented to the ED with signs and symptoms of a small bowel obstruction. She was admitted to the surgical service, made NPO, given NGT and underwent serial abdominal exams. On hospital day three NGT was removed. She continued to pass flatus throughout her hospitalization.  Vitals remained stable, and she was afebrile all throughout her hospital course. Labs were reviewed and electrolytes were replaced appropriately. Her abdominal pain improved and she began having bowel movements. Diet was advanced, and she was able to tolerate a regular diet prior to discharge. She was ambulating without difficulty and had normal bowel function prior to discharge. Patient was deemed suitable for discharge on hospital day 4. She was discharged home with  medications and instructions as below. She voiced understanding of the instructions prior to discharge.     For more thorough information, please refer to the hospital records.    Consults:   Consults         Status Ordering Provider     Inpatient consult to General surgery  Once     Provider:  (Not yet assigned)    Completed MACHELLE MEDINA          Significant Diagnostic Studies: Labs:   BMP:   Recent Labs  Lab 05/16/17  0457 05/17/17  0607   GLU 86 111*    142   K 4.1 3.8    104   CO2 24 28   BUN 7 10   CREATININE 0.7 0.8   CALCIUM 9.3 9.8   MG 2.0 2.2   , CMP   Recent Labs  Lab 05/16/17  0457 05/17/17  0607    142   K 4.1 3.8    104   CO2 24 28   GLU 86 111*   BUN 7 10   CREATININE 0.7 0.8   CALCIUM 9.3 9.8   ANIONGAP 9 10   ESTGFRAFRICA >60.0 >60.0   EGFRNONAA >60.0 >60.0   , CBC   Recent Labs  Lab 05/16/17  0457 05/17/17  0607   WBC 7.44 8.24   HGB 12.2 13.0   HCT 35.7* 39.7    294    All labs within the past 24 hours have been reviewed    Radiology:    KUB: X-Ray Abdomen AP 1 View (KUB):   Results for orders placed or performed during the hospital encounter of 05/12/17   X-Ray Abdomen AP 1 View    Narrative    CLINICAL HISTORY:  confirm NG tube placement    TECHNIQUE: Single view frontal radiograph of the abdomen    COMPARISON: CT abdomen pelvis 05/13/17      FINDINGS:  Support devices: Enteric tube tip and side overlies stomach.    Abdomen: Nonobstructive bowel gas pattern. No free air or portal venous gas on this single view. Pelvis is excluded from the field-of-view.    Chest: Lung bases are clear.    Other: N/A.    Impression    Enteric tube tip and side overlies stomach.      Electronically signed by: NICK KAUFMAN  Date:     05/13/17  Time:    10:33      CT scan: CT ABDOMEN PELVIS WITH CONTRAST:   Results for orders placed or performed during the hospital encounter of 05/12/17   CT Abdomen Pelvis With Contrast    Narrative    CT ABDOMEN, and, PELVIS  with IV  contrast    Protocol:  Axial images of the abdomen and pelvis were acquired  after the use of 75 cc [id number] IV contrast.  Coronal and sagittal reconstructions were also obtained    HISTORY:  47 year old F with Abdominal pain, N/V, lactate 4, WBC 19k    COMPARISON: None.     FINDINGS:  Heart: Normal in size. No pericardial effusion.     Lung Bases: Well aerated, without consolidation or pleural fluid.     Liver: Normal in size and attenuation, with no focal hepatic lesions.       Gallbladder: No calcified gallstones.     Bile Ducts: No evidence of dilated ducts.     Pancreas: No mass or peripancreatic fat stranding.      Spleen: Unremarkable.     Adrenals: Unremarkable.     Kidneys/ Ureters: Normal in size and location. Normal concentration and excretion of contrast. No hydronephrosis or nephrolithiasis. No ureteral dilatation.     Bladder: No evidence of wall thickening.     Reproductive organs: The lobulated appearance to the uterus suggesting the presence of multiple uterine fibroids.     GI Tract/Mesentery: There are multiple loops of dilated small bowel, concerning for a small bowel obstruction, with a questionable transition zone in the lower midline of the abdomen (axial series 6 image 46).  The small bowel distal to this appears decompressed.  The stomach is not distended.  The colon is normal in appearance with a small amount of stool.     Peritoneal Space: There is a small amount of ascites surrounding the liver. No free air.     Retroperitoneum:  No significant adenopathy.      Abdominal wall:  Unremarkable.     Vasculature: There is moderate calcific atherosclerosis of the aorta, without aneurysmal dilatation.     Bones: No acute fracture. Age-appropriate degenerative changes.    Impression         Multiple loops of dilated small bowel, with a questionable transition zone in the midline lower abdomen, suggestive of  an early distal small bowel obstruction.  No evidence of perforation.    There is a small  amount of ascites surrounding the liver.    Multiple uterine fibroids.    Atherosclerosis.    Additional details as above.    Findings visualized at 1:30 AM and discussed with Dr. Parker 1:38 AM    ______________________________________     Electronically signed by resident: CLAUDIA LALA MD  Date:     05/13/17  Time:    01:39            As the supervising and teaching physician, I personally reviewed the images and resident's interpretation and I agree with the findings.          Electronically signed by: BRETT OSWALD MD  Date:     05/13/17  Time:    01:54        Pending Diagnostic Studies:     None        Final Active Diagnoses:    Diagnosis Date Noted POA    PRINCIPAL PROBLEM:  Small bowel obstruction [K56.69] 05/13/2017 Yes    Hypokalemia [E87.6] 05/15/2017 Yes      Problems Resolved During this Admission:    Diagnosis Date Noted Date Resolved POA      Patient Active Problem List   Diagnosis    Small bowel obstruction    SBO (small bowel obstruction)    Hypokalemia     Discharged Condition: good    Disposition: Home or Self Care    Follow Up:  Follow-up Information     Follow up with Alisha Barreto PA-C On 5/30/2017.    Specialty:  Internal Medicine    Why:  Hospital follow-up for SBO: Appt: 5/30/17 at 09:30 AM. 2. Establish care w/Primary Care Dr (PCP) Dr. Jamal Pereira no appts available until: 7/25/17 at 10:40 AM.     Contact information:    [address]  [place] [id number] [phone]          Patient Instructions:     Diet general     Activity as tolerated     Call MD for:  difficulty breathing or increased cough     Call MD for:  redness, tenderness, or signs of infection (pain, swelling, redness, odor or green/yellow discharge around incision site)     Call MD for:  severe uncontrolled pain     Call MD for:  persistent nausea and vomiting or diarrhea     Call MD for:  temperature >100.4       Medications:  Reconciled Home Medications:   Current Discharge Medication List      START  taking these medications    Details   docusate sodium (COLACE) 50 MG capsule Take 1 capsule (50 mg total) by mouth once daily.  Refills: 0      Lactobacillus rhamnosus GG (CULTURELLE) 10 billion cell capsule Take 1 capsule by mouth once daily.      ondansetron (ZOFRAN-ODT) 4 MG TbDL Take 1 tablet (4 mg total) by mouth every 8 (eight) hours as needed.  Qty: 20 tablet, Refills: 0      polyethylene glycol (GLYCOLAX) 17 gram PwPk Take 17 g by mouth once daily.  Refills: 0         CONTINUE these medications which have NOT CHANGED    Details   escitalopram oxalate (LEXAPRO) 10 MG tablet Take 10 mg by mouth once daily.           Time spent on the discharge of patient: 3 minutes    Maria Eugenia Jim PA-C  General Surgery  [facility]

## 2017-05-17 NOTE — PROGRESS NOTES
[facility]  General Surgery  Progress Note    Subjective:     History of Present Illness:  47 y.o. female presenting with crampy severe mid/lower abdominal discomfort starting this evening around 5pm associated with multiple bouts of nausea and emesis. Has previously had a large fibroid removed via lower transverse incision about 10 years ago. Feeling better following pain and nausea medications but states she can still feel cramps. No fevers, chills, CP or SOB. No prior SBO. Last BM yesterday. Continues to pass flatus and some burping today.  Does have a history of self diagnosed IBS (mom with IBS) and lactose intolerance. Chronically with sporadic abdominal discomfort, cramps and diarrhea but this episode is different.      Post-Op Info:  * No surgery found *         Interval History:   Patient seen and examined, no acute events overnight, patient found walking the halls this AM, had bowel movement last night and reports feeling better, denies nausea/vomiting; eager to go home    Medications:  Continuous Infusions:   Scheduled Meds:   bisacodyl  10 mg Rectal Once    docusate sodium  50 mg Oral Daily    enoxaparin  40 mg Subcutaneous Q24H    Lactobacillus rhamnosus GG  1 capsule Oral Daily    polyethylene glycol  17 g Oral Daily    sodium chloride 0.9%  3 mL Intravenous Q8H     PRN Meds:benzocaine, butalbital-acetaminophen-caffeine -40 mg, lidocaine HCL 2%, ondansetron     Review of patient's allergies indicates:  No Known Allergies  Objective:     Vital Signs (Most Recent):  Temp: 98.6 °F (37 °C) (05/17/17 0722)  Pulse: 60 (05/17/17 0722)  Resp: 18 (05/17/17 0722)  BP: (!) 144/69 (05/17/17 0722)  SpO2: 98 % (05/17/17 0722) Vital Signs (24h Range):  Temp:  [97.9 °F (36.6 °C)-99.4 °F (37.4 °C)] 98.6 °F (37 °C)  Pulse:  [54-72] 60  Resp:  [16-20] 18  SpO2:  [97 %-100 %] 98 %  BP: (107-144)/(53-74) 144/69     Weight: 50.8 kg (112 lb)  Body mass index is 19.22 kg/(m^2).    Intake/Output  - Last 3 Shifts       05/15 0700 - 05/16 0659 05/16 0700 - 05/17 0659 05/17 0700 - 05/18 0659    P.O. 668 480     I.V. (mL/kg)  0 (0)     Total Intake(mL/kg) 668 (13.1) 480 (9.4)     Urine (mL/kg/hr)       Emesis/NG output       Drains       Stool       Blood       Total Output          Net +668 +480             Urine Occurrence 3 x 3 x     Stool Occurrence 0 x 1 x     Emesis Occurrence 0 x            Physical Exam   Constitutional: She is oriented to person, place, and time. She appears well-developed and well-nourished. No distress.   HENT:   Head: Normocephalic and atraumatic.   Cardiovascular: Normal rate and regular rhythm.    Pulmonary/Chest: Effort normal. No respiratory distress.   Abdominal:   Soft, nontender to palpation, no peritoneal signs, improved distention   Neurological: She is alert and oriented to person, place, and time.       Significant Labs:  CBC:   Recent Labs  Lab 05/17/17  0607   WBC 8.24   RBC 4.44   HGB 13.0   HCT 39.7      MCV 89   MCH 29.3   MCHC 32.7     BMP:   Recent Labs  Lab 05/17/17  0607   *      K 3.8      CO2 28   BUN 10   CREATININE 0.8   CALCIUM 9.8   MG 2.2     CMP:   Recent Labs  Lab 05/12/17  2319  05/17/17  0607   *  < > 111*   CALCIUM 10.8*  < > 9.8   ALBUMIN 4.8  --   --    PROT 8.3  --   --      < > 142   K 3.4*  < > 3.8   CO2 21*  < > 28     < > 104   BUN 18  < > 10   CREATININE 1.0  < > 0.8   ALKPHOS 44*  --   --    ALT 13  --   --    AST 17  --   --    BILITOT 0.8  --   --    < > = values in this interval not displayed.  LFTs:   Recent Labs  Lab 05/12/17  2319   ALT 13   AST 17   ALKPHOS 44*   BILITOT 0.8   PROT 8.3   ALBUMIN 4.8     Assessment/Plan:     * Small bowel obstruction  47 y.o. female with history of pelvic surgery and IBS now presenting with acute onset of crampy abdominal discomfort, nausea and emesis. CT consistent with likely adhesive SBO, partial by history and exam.     -Continue Regular diet  -Continue  bowel regimen  -plan for d/c today        Maria Eugenia Jim PA-C   [id number]  General Surgery  [facility]

## 2017-05-17 NOTE — SUBJECTIVE & OBJECTIVE
Interval History:   Patient seen and examined, no acute events overnight, patient found walking the halls this AM, had bowel movement last night and reports feeling better, denies nausea/vomiting; eager to go home    Medications:  Continuous Infusions:   Scheduled Meds:   bisacodyl  10 mg Rectal Once    docusate sodium  50 mg Oral Daily    enoxaparin  40 mg Subcutaneous Q24H    Lactobacillus rhamnosus GG  1 capsule Oral Daily    polyethylene glycol  17 g Oral Daily    sodium chloride 0.9%  3 mL Intravenous Q8H     PRN Meds:benzocaine, butalbital-acetaminophen-caffeine -40 mg, lidocaine HCL 2%, ondansetron     Review of patient's allergies indicates:  No Known Allergies  Objective:     Vital Signs (Most Recent):  Temp: 98.6 °F (37 °C) (05/17/17 0722)  Pulse: 60 (05/17/17 0722)  Resp: 18 (05/17/17 0722)  BP: (!) 144/69 (05/17/17 0722)  SpO2: 98 % (05/17/17 0722) Vital Signs (24h Range):  Temp:  [97.9 °F (36.6 °C)-99.4 °F (37.4 °C)] 98.6 °F (37 °C)  Pulse:  [54-72] 60  Resp:  [16-20] 18  SpO2:  [97 %-100 %] 98 %  BP: (107-144)/(53-74) 144/69     Weight: 50.8 kg (112 lb)  Body mass index is 19.22 kg/(m^2).    Intake/Output - Last 3 Shifts       05/15 0700 - 05/16 0659 05/16 0700 - 05/17 0659 05/17 0700 - 05/18 0659    P.O. 668 480     I.V. (mL/kg)  0 (0)     Total Intake(mL/kg) 668 (13.1) 480 (9.4)     Urine (mL/kg/hr)       Emesis/NG output       Drains       Stool       Blood       Total Output          Net +668 +480             Urine Occurrence 3 x 3 x     Stool Occurrence 0 x 1 x     Emesis Occurrence 0 x            Physical Exam   Constitutional: She is oriented to person, place, and time. She appears well-developed and well-nourished. No distress.   HENT:   Head: Normocephalic and atraumatic.   Cardiovascular: Normal rate and regular rhythm.    Pulmonary/Chest: Effort normal. No respiratory distress.   Abdominal:   Soft, nontender to palpation, no peritoneal signs, improved distention   Neurological:  She is alert and oriented to person, place, and time.       Significant Labs:  CBC:   Recent Labs  Lab 05/17/17  0607   WBC 8.24   RBC 4.44   HGB 13.0   HCT 39.7      MCV 89   MCH 29.3   MCHC 32.7     BMP:   Recent Labs  Lab 05/17/17  0607   *      K 3.8      CO2 28   BUN 10   CREATININE 0.8   CALCIUM 9.8   MG 2.2     CMP:   Recent Labs  Lab 05/12/17  2319  05/17/17  0607   *  < > 111*   CALCIUM 10.8*  < > 9.8   ALBUMIN 4.8  --   --    PROT 8.3  --   --      < > 142   K 3.4*  < > 3.8   CO2 21*  < > 28     < > 104   BUN 18  < > 10   CREATININE 1.0  < > 0.8   ALKPHOS 44*  --   --    ALT 13  --   --    AST 17  --   --    BILITOT 0.8  --   --    < > = values in this interval not displayed.  LFTs:   Recent Labs  Lab 05/12/17  2319   ALT 13   AST 17   ALKPHOS 44*   BILITOT 0.8   PROT 8.3   ALBUMIN 4.8

## 2017-05-17 NOTE — NURSING
Pt discharged home per physician orders. Pt verbalized understanding d/c instructions. VSS. IV removed and cathter tip intact. Pt left escorted by staff nurse.

## 2017-05-30 ENCOUNTER — OFFICE VISIT (OUTPATIENT)
Dept: INTERNAL MEDICINE | Facility: CLINIC | Age: 47
End: 2017-05-30
Payer: COMMERCIAL

## 2017-05-30 ENCOUNTER — TELEPHONE (OUTPATIENT)
Dept: INTERNAL MEDICINE | Facility: CLINIC | Age: 47
End: 2017-05-30

## 2017-05-30 VITALS
SYSTOLIC BLOOD PRESSURE: 118 MMHG | BODY MASS INDEX: 18.8 KG/M2 | HEIGHT: 64 IN | HEART RATE: 84 BPM | DIASTOLIC BLOOD PRESSURE: 76 MMHG | WEIGHT: 110.13 LBS

## 2017-05-30 DIAGNOSIS — K56.609 SBO (SMALL BOWEL OBSTRUCTION): Primary | ICD-10-CM

## 2017-05-30 DIAGNOSIS — K64.9 HEMORRHOIDS, UNSPECIFIED HEMORRHOID TYPE: ICD-10-CM

## 2017-05-30 DIAGNOSIS — R18.8 OTHER ASCITES: ICD-10-CM

## 2017-05-30 PROCEDURE — 99213 OFFICE O/P EST LOW 20 MIN: CPT | Mod: S$GLB,,, | Performed by: PHYSICIAN ASSISTANT

## 2017-05-30 PROCEDURE — 99999 PR PBB SHADOW E&M-EST. PATIENT-LVL IV: CPT | Mod: PBBFAC,,, | Performed by: PHYSICIAN ASSISTANT

## 2017-05-30 RX ORDER — FLUTICASONE PROPIONATE 50 MCG
SPRAY, SUSPENSION (ML) NASAL
COMMUNITY
Start: 2017-05-08

## 2017-05-30 NOTE — TELEPHONE ENCOUNTER
----- Message from Hellen Saravia sent at 5/30/2017 11:58 AM CDT -----  Contact: Self/[phone]  Patient called in regards needing to inform KERLINE Barreto that the rx that she prescribe her is too expensive and she want to know if there is a generic that patient insurance could cover. Please call and advise.           Thank you!!!

## 2017-05-30 NOTE — PROGRESS NOTES
Transitional Care Note  Subjective:       Patient ID: Jennifer Caballero is a 47 y.o. female.  Chief Complaint: Follow-up    Family and/or Caretaker present at visit?  No.  Diagnostic tests reviewed/disposition: No diagnosic tests pending after this hospitalization.  Disease/illness education: SBO  Home health/community services discussion/referrals: Patient does not have home health established from hospital visit.  They do not need home health.  If needed, we will set up home health for the patient.   Establishment or re-establishment of referral orders for community resources: No other necessary community resources.   Discussion with other health care providers: No discussion with other health care providers necessary.   Jennifer Caballero is an established patient of Luis Lopez MD here today for [facility] hospital f/u visit for SBO.      She was admitted for SBO x 4 days.  She had NGT placed.  Since discharge, she has some mild cramping sometimes after eating.  No nausea or vomiting.  No diarrhea or constipation.  Last bowel movement this morning.  She is taking a probiotic.  She is drinking more water.  No fever.  She feels significantly better.    She notes that she has two hemorrhoids.  No bleeding or pain.  She is using Preparation H OTC.            Review of Systems   Constitutional: Negative for chills, diaphoresis, fatigue and fever.   HENT: Negative for congestion and sore throat.    Eyes: Negative for visual disturbance.   Respiratory: Negative for cough, chest tightness and shortness of breath.    Cardiovascular: Negative for chest pain, palpitations and leg swelling.   Gastrointestinal: Negative for abdominal pain, blood in stool, constipation, diarrhea, nausea and vomiting.        Hemorrhoid   Genitourinary: Negative for dysuria, frequency, hematuria and urgency.   Musculoskeletal: Negative for arthralgias and back pain.   Skin: Negative for rash.   Neurological: Negative for dizziness, syncope, weakness and  headaches.   Psychiatric/Behavioral: Negative for dysphoric mood and sleep disturbance. The patient is not nervous/anxious.        Objective:      Physical Exam   Constitutional: She appears well-developed and well-nourished. No distress.   HENT:   Head: Normocephalic and atraumatic.   Right Ear: Tympanic membrane and external ear normal.   Left Ear: Tympanic membrane and external ear normal.   Nose: Nose normal.   Mouth/Throat: Oropharynx is clear and moist.   Eyes: Conjunctivae are normal. Pupils are equal, round, and reactive to light.   Cardiovascular: Normal rate, regular rhythm and normal heart sounds.  Exam reveals no gallop.    No murmur heard.  Pulmonary/Chest: Effort normal and breath sounds normal. No respiratory distress.   Abdominal: Soft. Normal appearance. There is no tenderness. There is no rebound, no guarding and no CVA tenderness.   Genitourinary: Rectal exam shows external hemorrhoid (2 non thrombosed hemorrhoids).   Musculoskeletal: She exhibits no edema.   Neurological: She is alert.   Skin: Skin is warm and dry. She is not diaphoretic.   Psychiatric: She has a normal mood and affect.   Nursing note and vitals reviewed.      Assessment:       1. SBO (small bowel obstruction)    2. Other ascites    3. Hemorrhoids, unspecified hemorrhoid type        Plan:       Jennifer was seen today for follow-up.    Diagnoses and all orders for this visit:    SBO (small bowel obstruction)-sx have resolved, if cramping does not resolve over the next couple of days, advised to schedule a f/u with her surgeon    Other ascites-repeat abdominal US next week, f/u with PCP regarding this  -     US Abdomen Complete; Future    Hemorrhoids, unspecified hemorrhoid type-will refer to CRS if not improving  -     hydrocortisone-pramoxine (PROCTOFOAM-HS) rectal foam; Place 1 applicator rectally 2 (two) times daily.    Pt has been given instructions populated from [employer] database and has verbalized understanding of the after  "visit summary and information contained wherein.    Follow up with a primary care provider. May go to ER for acute shortness of breath, lightheadedness, fever, or any other emergent complaints or changes in condition.    "This note will be shared with the patient"       "

## 2017-05-30 NOTE — PATIENT INSTRUCTIONS
Small Bowel Obstruction     Small bowel obstruction can lead to tissue damage and even tissue death.   A small bowel obstruction occurs when part or all of the small intestine (bowel) is blocked. As a result, digestive contents cant move through the bowel properly and out of the body. Treatment is needed right away to remove the blockage. This can ease painful symptoms. It can also prevent serious problems, such as tissue death or bursting (rupture) of the small bowel. Without treatment, a small bowel obstruction can be fatal.  Causes of small bowel obstruction  A small bowel obstruction can be caused by:  · Scar tissue (adhesions). These may form after belly (abdominal) surgery or an infection.  · Hernia. A hernia is when an organ pushes through a weak spot or tear in the abdomen wall. Part of the small bowel can push out and be seen as a bulge under the belly. Hernias can also occur internally.  · Certain health problems. These include when part of the bowel slides inside another part (intussusception). Other causes include irritable bowel disease such as Crohns disease, and inflammation and sores in the intestine (ulcerative colitis).  · Abnormal tissue growths (tumors). These can form on the inside or outside of the small bowel. They are usually due to cancer.  Symptoms of small bowel obstruction  Common symptoms include:  · Belly cramping and pain  · Belly swelling and bloating  · Upset stomach (nausea) and vomiting  · Can't  pass gas  · Can't pass stool (constipation)  · Diarrhea  Diagnosing small bowel obstruction  Your provider will ask about your symptoms and health history. Youll also have a physical exam. Tests may also be done to confirm the problem. These can include:  · Imaging tests. These provide pictures of the small bowel. Common tests include X-rays and a CT scan.  · Blood tests. These check for infection and other problems, such as excess fluid loss (dehydration).  · Upper GI  (gastrointestinal) series with a small bowel follow-through. This test takes X-rays of the upper digestive tract from the mouth through the small bowel. An X-ray dye (contrast fluid) is used. The dye coats the inside of your upper digestive tract so it will show up clearly on X-rays.  Treating small bowel obstruction  Treatment takes place in a hospital. As part of your care, the following may be done:  · No food or drink is given by mouth. This allows your bowels to rest.  · An IV (intravenous) line is placed in a vein in your arm or hand. The IV line is used to give fluids. It may also be used to give medicines. These may be needed to ease pain, nausea, and other symptoms. They may also be needed to treat or prevent infections.  · A soft, thin, flexible tube (nasogastric tube) is inserted through your nose and into your stomach. The tube is used to remove extra gas and fluid in your stomach and bowels. This helps to ease symptoms such as pain and swelling.  · In severe cases, surgery is done. This may be needed if the small bowel is almost or totally blocked, or there is a hole in the bowel (bowel perforation). During surgery, the blockage is removed. Parts of the bowel may also be removed if there is tissue death. Other repair may be done as well, depending on what caused the blockage. Your healthcare provider will give you more information about surgery, if needed.  · Youll be watched closely in the hospital until your symptoms improve. Your provider will tell you when you can go home.  Long-term concerns   After treatment, most people recover with no lasting effects. If a long part of the bowel is removed, there is a greater chance for lifelong digestive problems. Bowel movements may become irregular. Work with your provider to learn the best ways to manage any symptoms you may have, and to protect your health.  When to call your healthcare provider  Call your provider right away if you have any of the  following:  · Severe pain (Call 911)  · Belly swelling or cramping that wont go away  · Cant pass stool or gas  · Nausea or vomiting (especially if the vomit looks or smells like stool)   Date Last Reviewed: 7/1/2016 © 2000-2016 [employer]. [address] [place] [address], [place] [address]. All rights reserved. This information is not intended as a substitute for professional medical care. Always follow your healthcare professional's instructions.        Treating Hemorrhoids: Self-Care    Follow your healthcare providers advice about caring for your hemorrhoids at home. Some treatments help relieve symptoms right away. Others involve making changes in your diet and exercise habits. These can help ease constipation and prevent hemorrhoid symptoms from coming back.  Relieving symptoms  Your healthcare provider may prescribe anti-inflammatory medicine to help ease your symptoms. The following tips will also help relieve pain and swelling.  · Take sitz baths. Taking a sitz bath means sitting in a few inches of warm bath water. Soaking for 10 minutes twice a day can provide welcome relief from painful hemorrhoids. It can also help the area stay clean.  · Develop good bowel habits. Use the bathroom when you need to. Dont ignore the urge to move your bowels. This can lead to constipation, hard stools, and straining. Also, dont read while on the toilet. Sit only as long as needed. Wipe gently with soft, unscented toilet tissue or baby wipes.  · Use ice packs. Placing an ice pack on a thrombosed external hemorrhoid can help relieve pain right away. It will also help reduce the blood clot. Use the ice for 15 to 20 minutes at a time. Keep a cloth between the ice and your skin to prevent skin damage.  · Use other measures. Laxatives and enemas can help ease constipation. But use them only on your healthcare providers advice. For symptom relief, try using cotton pads soaked in witch hazel. These are available at  most drugstores. Over-the-counter hemorrhoid ointments and petroleum jelly can also provide relief.  Add fiber to your diet  Adding fiber to your diet can help relieve constipation by making stools softer and easier to pass. To increase your fiber intake, your healthcare provider may recommend a bulking agent, such as psyllium. This is a high-fiber supplement available at most grocery stores and drugstores. Eating more fiber-rich foods will also help. There are two types of fiber:  · Insoluble fiber is the main ingredient in bulking agents. Its also found in foods such as wheat bran, whole-grain breads, fresh fruits, and vegetables.  · Soluble fiber is found in foods such as oat bran. Although soluble fiber is good for you, it may not ease constipation as much as foods high in insoluble fiber.  Drink more water  Along with a high-fiber diet, drinking more water can help ease constipation. This is because insoluble fiber absorbs water, making stools soft and bulky. Be sure to drink plenty of water throughout the day. Drinking fruit juices, such as prune juice or apple juice, can also help prevent constipation.  Get more exercise  Regular exercise aids digestion and helps prevent constipation. Its also great for your health. So talk with your healthcare provider about starting an exercise program. Low-impact activities, such as swimming or walking, are good places to start. Take it easy at first. And remember to drink plenty of water when you exercise.  High-fiber foods  High-fiber foods offer many benefits. By making your stools softer, they help heal and prevent swollen hemorrhoids. They may also help reduce the risk of colon and rectal cancer. Best of all, theyre usually low in calories and taste great. Here are some examples of fiber-rich foods.  · Whole grains, such as wheat bran, corn bran, and brown rice.  · Vegetables, especially carrots, broccoli, cabbage, and peas.  · Fruits, such as apples, bananas,  raisins, peaches, and pears.  · Nuts and legumes, especially peanuts, lentils, and kidney beans.  Easy ways to add fiber  The tips below offer some simple ways to add more high-fiber foods to your meals.  · Start your day with a high-fiber breakfast. Eat a wheat bran cereal along with a sliced banana. Or, try peanut butter on whole-wheat toast.  · Eat carrot sticks for snacks. Theyre easy to prepare, taste great, and are low in calories.  · Use whole-grain breads instead of white bread for sandwiches.  · Eat fruits for treats. Try an apple and some raisins instead of a candy bar.   Date Last Reviewed: 7/1/2016  © 8236-3858 [employer]. [address] [place] [address], [place] [address]. All rights reserved. This information is not intended as a substitute for professional medical care. Always follow your healthcare professional's instructions.

## 2017-05-31 RX ORDER — HYDROCORTISONE ACETATE 25 MG/1
25 SUPPOSITORY RECTAL 2 TIMES DAILY
Qty: 20 SUPPOSITORY | Refills: 0 | Status: SHIPPED | OUTPATIENT
Start: 2017-05-31 | End: 2017-06-01

## 2017-05-31 NOTE — TELEPHONE ENCOUNTER
----- Message from Tamika Vanessa sent at 5/31/2017  1:39 PM CDT -----  Contact: Self.Call [phone]  The suppositories you called in for her is  Very expensive, $135.00. Please call in a generic script to [facility] -[phone] (Phone) [phone] (Fax).

## 2017-06-01 ENCOUNTER — TELEPHONE (OUTPATIENT)
Dept: INTERNAL MEDICINE | Facility: CLINIC | Age: 47
End: 2017-06-01

## 2017-06-01 DIAGNOSIS — K64.9 HEMORRHOIDS, UNSPECIFIED HEMORRHOID TYPE: Primary | ICD-10-CM

## 2017-06-01 RX ORDER — HYDROCORTISONE 25 MG/G
CREAM TOPICAL 2 TIMES DAILY
Qty: 28 G | Refills: 0 | Status: SHIPPED | OUTPATIENT
Start: 2017-06-01 | End: 2017-10-03

## 2017-06-01 NOTE — TELEPHONE ENCOUNTER
----- Message from Mamadou Crum MA sent at 5/31/2017  4:41 PM CDT -----  Contact: Self.Call [phone]      ----- Message -----  From: Tamika Vanessa  Sent: 5/31/2017   1:39 PM  To: Estevan Silvestre    The suppositories you called in for her is  Very expensive, $135.00. Please call in a generic script to [facility] -[phone] (Phone) [phone] (Fax).

## 2017-06-01 NOTE — TELEPHONE ENCOUNTER
Sent in hemorrhoid cream.  If this is too expensive too, she will need to try OTC hemorrhoid treatment or call insurance company to see what is covered.

## 2017-06-12 ENCOUNTER — TELEPHONE (OUTPATIENT)
Dept: INTERNAL MEDICINE | Facility: CLINIC | Age: 47
End: 2017-06-12

## 2017-06-12 ENCOUNTER — HOSPITAL ENCOUNTER (OUTPATIENT)
Dept: RADIOLOGY | Facility: HOSPITAL | Age: 47
Discharge: HOME | End: 2017-06-12
Attending: INTERNAL MEDICINE
Payer: COMMERCIAL

## 2017-06-12 DIAGNOSIS — R18.8 OTHER ASCITES: ICD-10-CM

## 2017-06-12 PROCEDURE — 76700 US EXAM ABDOM COMPLETE: CPT | Mod: TC

## 2017-06-12 PROCEDURE — 76700 US EXAM ABDOM COMPLETE: CPT | Mod: 26,,, | Performed by: RADIOLOGY

## 2017-06-12 NOTE — TELEPHONE ENCOUNTER
Please call patient.  Abdominal US was acceptable.  She should keep her f/u with Dr. Pereira in July.

## 2017-06-29 ENCOUNTER — TELEPHONE (OUTPATIENT)
Dept: INTERNAL MEDICINE | Facility: CLINIC | Age: 47
End: 2017-06-29

## 2017-06-29 ENCOUNTER — NURSE TRIAGE (OUTPATIENT)
Dept: ADMINISTRATIVE | Facility: CLINIC | Age: 47
End: 2017-06-29

## 2017-06-29 NOTE — TELEPHONE ENCOUNTER
Reason for Disposition   Tenderness or swelling of front of neck over windpipe    Protocols used: ST NECK PAIN OR [id number]    Jennifer reporting she has what she thinks is a swollen vein on the left side of her neck. She rates the tenderness there as a 4/10. She denies sob, chest pain. She can touch her chin to her chest. She does report some neck stiffness, but denies headache/fever. She is still going about normal activities. Appointment was scheduled for tomorrow by scheduling desk. Advised patient to go to appointment tomorrow but if any new or worsening symptoms she should call back. She agrees to plan. Please contact caller with any further care advice.

## 2017-06-29 NOTE — TELEPHONE ENCOUNTER
Pt was scheduled incorrectly on Dr. Benito schedule tried calling patient no answer left voice message for patient to call clinic back to schedule with another physician.

## 2017-10-03 ENCOUNTER — OFFICE VISIT (OUTPATIENT)
Dept: INTERNAL MEDICINE | Facility: CLINIC | Age: 47
End: 2017-10-03
Payer: COMMERCIAL

## 2017-10-03 VITALS
TEMPERATURE: 98 F | DIASTOLIC BLOOD PRESSURE: 64 MMHG | SYSTOLIC BLOOD PRESSURE: 98 MMHG | HEIGHT: 64 IN | BODY MASS INDEX: 19.91 KG/M2 | OXYGEN SATURATION: 98 % | WEIGHT: 116.63 LBS | HEART RATE: 70 BPM

## 2017-10-03 DIAGNOSIS — M77.8 RIGHT ELBOW TENDONITIS: ICD-10-CM

## 2017-10-03 DIAGNOSIS — Z86.010 HISTORY OF COLON POLYPS: ICD-10-CM

## 2017-10-03 DIAGNOSIS — F32.A DEPRESSION, UNSPECIFIED DEPRESSION TYPE: Primary | ICD-10-CM

## 2017-10-03 DIAGNOSIS — Z00.00 ENCOUNTER FOR HEALTH MAINTENANCE EXAMINATION: ICD-10-CM

## 2017-10-03 DIAGNOSIS — Z87.19 HISTORY OF SMALL BOWEL OBSTRUCTION: ICD-10-CM

## 2017-10-03 PROBLEM — K56.609 SBO (SMALL BOWEL OBSTRUCTION): Status: RESOLVED | Noted: 2017-05-13 | Resolved: 2017-10-03

## 2017-10-03 PROCEDURE — 99214 OFFICE O/P EST MOD 30 MIN: CPT | Mod: S$GLB,,, | Performed by: INTERNAL MEDICINE

## 2017-10-03 PROCEDURE — 99999 PR PBB SHADOW E&M-EST. PATIENT-LVL IV: CPT | Mod: PBBFAC,,, | Performed by: INTERNAL MEDICINE

## 2017-10-03 RX ORDER — ESCITALOPRAM OXALATE 20 MG/1
20 TABLET ORAL DAILY
Qty: 30 TABLET | Refills: 11 | Status: SHIPPED | OUTPATIENT
Start: 2017-10-03 | End: 2018-11-26 | Stop reason: SDUPTHER

## 2017-10-03 RX ORDER — ESCITALOPRAM OXALATE 20 MG/1
20 TABLET ORAL DAILY
Qty: 30 TABLET | Refills: 11 | Status: SHIPPED | OUTPATIENT
Start: 2017-10-03 | End: 2017-10-03 | Stop reason: SDUPTHER

## 2017-10-03 RX ORDER — TRAMADOL HYDROCHLORIDE 50 MG/1
TABLET ORAL
COMMUNITY
Start: 2017-07-27 | End: 2019-10-24

## 2017-10-03 RX ORDER — ESCITALOPRAM OXALATE 20 MG/1
TABLET ORAL
COMMUNITY
Start: 2017-09-19 | End: 2017-10-03 | Stop reason: SDUPTHER

## 2017-10-03 RX ORDER — FLUCONAZOLE 150 MG/1
TABLET ORAL
COMMUNITY
Start: 2017-08-09 | End: 2018-11-26 | Stop reason: SDUPTHER

## 2017-10-03 RX ORDER — DICLOFENAC SODIUM 30 MG/G
GEL TOPICAL
COMMUNITY
Start: 2017-07-28 | End: 2017-10-03

## 2017-10-03 RX ORDER — LIDOCAINE 50 MG/G
OINTMENT TOPICAL
COMMUNITY
Start: 2017-07-28

## 2017-10-03 RX ORDER — DICLOFENAC SODIUM 20 MG/G
SOLUTION TOPICAL
COMMUNITY
Start: 2017-08-10 | End: 2017-10-03

## 2017-10-03 RX ORDER — FLUOCINONIDE 1 MG/G
CREAM TOPICAL
COMMUNITY
Start: 2017-07-28

## 2017-10-03 NOTE — PROGRESS NOTES
"Subjective:       Patient ID: Jennifer Caballero is a 47 y.o. female.    Chief Complaint: Establish Care    HPI  48 y/o woman with h/o SBO earlier this year here to establish care.     Admitted to hospital 5/2017 for SBO, followed up with PA here after this.  Sometimes gets nausea, no vomiting, regular BM. Taking probiotic.  Had colonoscopy done recently - recommended repeat at 3-5 years. Has FHx of colon cancer - had 1st c-scope in her 40s, has had multiple polyps removed. Has had 3 colonoscopies.     Takes lexapro - on this since her 20s, feels that her depression is significantly worse when she doesn't take this.   Has been to psychiatrist in the past, has tried other medications in the past.  Doesn't want to work with a therapist.     Takes allegra-D and flonase for allergies; doesn't take these every day.      Notes jugular vein on left side seems more "protruding" and is tender occasionally. Not bothering her currently.     R elbow tendonitis - follows with Dr Pastor Flowers at [facility], had cortisone injection in elbow ~2 months ago for that. Due for repeat.  Taking tramadol occasionally, but this causes some nausea, so takes zofran along with this. Plays drums, likes playing golf.     Follows with Dr Lopez for Gyn.   H/o fibroid, s/p myomectomy along with repair of fallopian tube - in late 30s.   Gets mammogram regularly.   Has menses, sometimes skips a month recently. Has had hot flashes and night sweats since her 30s.     Depression - Takes lexapro, has been taking this for years. Continues to feel depressed but feels that this helps somewhat. Has tried multiple other medications and treatments in the past; has tried working with a counselor but doesn't feel that this is helpful for her.   Sometimes feels she has difficulty with focus.    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   "I live in three different places" - moves between three cities regularly (two homes of her own, " "also sometimes stays with her mother).   Not sedentary but not exercising regularly.    Review of Systems   Constitutional: Negative for activity change and fever.   HENT: Negative.    Eyes: Negative for visual disturbance.   Respiratory: Negative for cough and shortness of breath.    Cardiovascular: Negative for chest pain, palpitations and leg swelling.   Gastrointestinal: Negative for abdominal pain, blood in stool, constipation and diarrhea.   Endocrine: Negative.    Genitourinary: Negative.    Musculoskeletal: Positive for arthralgias. Negative for gait problem and joint swelling.   Skin: Negative for rash.   Neurological: Negative for weakness and numbness.   Psychiatric/Behavioral: Positive for dysphoric mood. Negative for self-injury and suicidal ideas.         Past medical history, surgical history, and family medical history reviewed and updated as appropriate.    Medications and allergies reviewed.     Objective:          Vitals:    10/03/17 1351   BP: 98/64   BP Location: Left arm   Patient Position: Sitting   Pulse: 70   Temp: 98.1 °F (36.7 °C)   TempSrc: Oral   SpO2: 98%   Weight: 52.9 kg (116 lb 10 oz)   Height: 5' 4" (1.626 m)     Body mass index is 20.02 kg/m².  Physical Exam   Constitutional: She is oriented to person, place, and time. She appears well-developed and well-nourished. No distress.   HENT:   Head: Normocephalic and atraumatic.   Nose: Nose normal.   Mouth/Throat: Oropharynx is clear and moist.   Eyes: Conjunctivae and EOM are normal. Pupils are equal, round, and reactive to light.   Neck: Normal range of motion. Neck supple. No thyromegaly present.   Cardiovascular: Normal rate, regular rhythm, normal heart sounds and intact distal pulses.    No murmur heard.  Prominent jugular vein bilateral, does not appear abnormal. No masses or tenderness.   Pulmonary/Chest: Effort normal and breath sounds normal. No respiratory distress. She has no wheezes.   Abdominal: Soft. Bowel sounds are " normal. She exhibits no distension. There is no tenderness.   Musculoskeletal: Normal range of motion. She exhibits no edema or tenderness.   Lymphadenopathy:     She has no cervical adenopathy.   Neurological: She is alert and oriented to person, place, and time. No cranial nerve deficit. Gait normal.   Skin: Skin is warm and dry.   Psychiatric:   Speech somewhat pressured. Endorses dysphoric mood, but states this is her baseline.    Vitals reviewed.      Lab Results   Component Value Date    WBC 8.24 05/17/2017    HGB 13.0 05/17/2017    HCT 39.7 05/17/2017     05/17/2017    ALT 13 05/12/2017    AST 17 05/12/2017     05/17/2017    K 3.8 05/17/2017     05/17/2017    CREATININE 0.8 05/17/2017    BUN 10 05/17/2017    CO2 28 05/17/2017       Assessment:       1. Depression, unspecified depression type    2. Right elbow tendonitis    3. History of small bowel obstruction    4. Encounter for health maintenance examination    5. History of colon polyps        Plan:   Jennifer was seen today for [facility] care.    Diagnoses and all orders for this visit:    Depression, unspecified depression type - approximately stable on current dose of lexapro, not currently interested in working with psychiatrist or counselor. Does not want to change medications. Will continue lexapro; new rx given  -     escitalopram oxalate (LEXAPRO) 20 MG tablet; Take 1 tablet (20 mg total) by mouth once daily.    Right elbow tendonitis - following with Dr Pastor Flowers    History of small bowel obstruction - no recent symptoms, doing well  -     CBC auto differential; Future  -     Comprehensive metabolic panel; Future  -     Lipid panel; Future    Encounter for health maintenance examination - reviewed previous labs; will repeat these at 1 year (May 2018).   -     CBC auto differential; Future  -     Comprehensive metabolic panel; Future  -     Lipid panel; Future  -     Hemoglobin A1c; Future  -     TSH; Future    History of colon  polyps - requested she bring in record from her last colonoscopy.    Health maintenance reviewed with patient.   Records requested from Dr Lopez    Return in about 7 months (around 5/3/2018) for annual physical.    Jamal Pereira MD  Internal Medicine  [facility]  10/5/2017

## 2017-10-05 PROBLEM — Z86.0100 HISTORY OF COLON POLYPS: Status: ACTIVE | Noted: 2017-10-05

## 2017-10-05 PROBLEM — Z86.010 HISTORY OF COLON POLYPS: Status: ACTIVE | Noted: 2017-10-05

## 2017-10-05 PROBLEM — M77.8 RIGHT ELBOW TENDONITIS: Status: ACTIVE | Noted: 2017-10-05

## 2017-10-05 PROBLEM — F32.A DEPRESSION: Status: ACTIVE | Noted: 2017-10-05

## 2018-04-23 ENCOUNTER — TELEPHONE (OUTPATIENT)
Dept: INTERNAL MEDICINE | Facility: CLINIC | Age: 48
End: 2018-04-23

## 2018-04-23 NOTE — TELEPHONE ENCOUNTER
----- Message from Ashley Dobbs MA sent at 4/23/2018  2:57 PM CDT -----  Contact: self [phone]      ----- Message -----  From: Rosa Isela Quesada  Sent: 4/23/2018   2:51 PM  To: [facility] Staff    Patient requesting a call from the office in regards to protruding vein in neck . Please call and advise , Thanks

## 2018-04-23 NOTE — TELEPHONE ENCOUNTER
Pt calling with the same issue she had last 6/2017. See note below. Refused appointment today. She only wants to see a physician. She agreed to an appointment with Dr Dietrich because she wants to be seen after 430 om.

## 2018-04-23 NOTE — TELEPHONE ENCOUNTER
Lisa Flanagan RN          6/29/17 11:16 AM   Note            Reason for Disposition   Tenderness or swelling of front of neck over windpipe    Protocols used: ST NECK PAIN OR [id number]     Stacie reporting she has what she thinks is a swollen vein on the left side of her neck. She rates the tenderness there as a 4/10. She denies sob, chest pain. She can touch her chin to her chest. She does report some neck stiffness, but denies headache/fever. She is still going about normal activities. Appointment was scheduled for tomorrow by scheduling desk. Advised patient to go to appointment tomorrow but if any new or worsening symptoms she should call back. She agrees to plan. Please contact caller with any further care advice.

## 2018-04-25 ENCOUNTER — OFFICE VISIT (OUTPATIENT)
Dept: INTERNAL MEDICINE | Facility: CLINIC | Age: 48
End: 2018-04-25
Payer: COMMERCIAL

## 2018-04-25 VITALS
DIASTOLIC BLOOD PRESSURE: 78 MMHG | HEART RATE: 79 BPM | HEIGHT: 64 IN | TEMPERATURE: 98 F | SYSTOLIC BLOOD PRESSURE: 130 MMHG | OXYGEN SATURATION: 98 % | BODY MASS INDEX: 20.03 KG/M2 | WEIGHT: 117.31 LBS

## 2018-04-25 DIAGNOSIS — R45.89 ANXIETY ABOUT HEALTH: Primary | ICD-10-CM

## 2018-04-25 PROCEDURE — 99213 OFFICE O/P EST LOW 20 MIN: CPT | Mod: S$GLB,,, | Performed by: INTERNAL MEDICINE

## 2018-04-25 PROCEDURE — 99999 PR PBB SHADOW E&M-EST. PATIENT-LVL III: CPT | Mod: PBBFAC,,, | Performed by: INTERNAL MEDICINE

## 2018-04-26 NOTE — PROGRESS NOTES
Subjective:       Patient ID: Jennifer Caballero is a 48 y.o. female.    Chief Complaint: Neck Pain    Concerned about prominent vein in left neck.  Area is occasionally painful      Review of Systems   Constitutional: Negative for activity change, chills, fatigue and fever.   HENT: Negative for congestion, ear pain, nosebleeds, postnasal drip, sinus pressure and sore throat.    Eyes: Negative.  Negative for visual disturbance.   Respiratory: Negative for cough, chest tightness, shortness of breath and wheezing.    Cardiovascular: Negative for chest pain.   Gastrointestinal: Negative for abdominal pain, diarrhea, nausea and vomiting.   Genitourinary: Negative for difficulty urinating, dysuria, frequency and urgency.   Musculoskeletal: Negative for arthralgias and neck stiffness.   Skin: Negative for rash.   Neurological: Negative for dizziness, weakness and headaches.   Psychiatric/Behavioral: Negative for sleep disturbance. The patient is not nervous/anxious.        Objective:      Physical Exam   Neck: Normal carotid pulses, no hepatojugular reflux and no JVD present. Carotid bruit is not present.           Assessment:       1. Anxiety about health        Plan:   Jennifer was seen today for neck pain.    Diagnoses and all orders for this visit:    Anxiety about health

## 2018-05-07 DIAGNOSIS — Z12.11 COLON CANCER SCREENING: ICD-10-CM

## 2018-05-07 DIAGNOSIS — Z12.39 BREAST CANCER SCREENING: ICD-10-CM

## 2018-11-23 DIAGNOSIS — F32.A DEPRESSION, UNSPECIFIED DEPRESSION TYPE: ICD-10-CM

## 2018-11-23 NOTE — LETTER
November 28, 2018        Jennifer Caballero  [address] [facility] [id number]             [facility]  [address]  [place] [address]  Phone: [phone]  Fax: [phone]   Patient: Jennifer Caballero   MR Number: [id number]   YOB: 1970   Date of Visit: 11/23/2018       Dear Dr. Caballero:    Thank you for referring Jennifer Caballero to me for evaluation. Below are the relevant portions of my assessment and plan of care.            If you have questions, please do not hesitate to call me. I look forward to following Jennifer along with you.    Sincerely,      Sharon Owens MA           CC  Jamal Pereira MD

## 2018-11-23 NOTE — LETTER
November 28, 2018    Jennifer Caballero  [address] [facility] [address]             [facility]  [address]  [place] [address]  Phone: [phone]  Fax: [phone] Dear Ms. Jennifer Caballero:    We recently received a refill request for one of your medications. On reviewing your chart, it has been more than a year since your last general primary care visit, and out of concern for safety, I do not continue to prescribe medications if it has been much more than a year since you have last been seen.  Your health and follow-up medical care are important to us. Please call our office as soon as possible so that we may help schedule you for an annual exam. If you have already scheduled your appointment, please disregard this letter.    Sincerely,          Jamal Pereira MD

## 2018-11-26 RX ORDER — ESCITALOPRAM OXALATE 20 MG/1
TABLET ORAL
Qty: 30 TABLET | Refills: 0 | OUTPATIENT
Start: 2018-11-26

## 2018-11-26 RX ORDER — ESCITALOPRAM OXALATE 20 MG/1
TABLET ORAL
Qty: 90 TABLET | Refills: 0 | Status: SHIPPED | OUTPATIENT
Start: 2018-11-26

## 2018-11-26 NOTE — TELEPHONE ENCOUNTER
Overdue for follow up / annual exam, needs to schedule. Please contact her to schedule for EPP  Refill sent

## 2018-11-28 NOTE — TELEPHONE ENCOUNTER
Called pt left  requesting return call. Unable to reach pt after several attempts. Please advise.

## 2019-05-10 DIAGNOSIS — Z12.11 COLON CANCER SCREENING: ICD-10-CM

## 2019-05-15 ENCOUNTER — TELEPHONE (OUTPATIENT)
Dept: INTERNAL MEDICINE | Facility: CLINIC | Age: 49
End: 2019-05-15

## 2019-05-15 NOTE — TELEPHONE ENCOUNTER
Patient last seen >1 year ago, please contact to schedule for annual with labs prior - labs already ordered.   FIT also ordered, please let her know to do this for colon cancer screening if she has not already had colonoscopy elsewhere

## 2019-05-16 NOTE — TELEPHONE ENCOUNTER
Good afternoon, the patient has been scheduled for labs, annual visit, and mammogram. The number for colonoscopy was given also the option for the Fit Kit     Thank you

## 2019-08-26 DIAGNOSIS — Z12.39 BREAST CANCER SCREENING: ICD-10-CM

## 2019-10-24 ENCOUNTER — HOSPITAL ENCOUNTER (EMERGENCY)
Facility: HOSPITAL | Age: 49
Discharge: HOME | End: 2019-10-24
Attending: EMERGENCY MEDICINE
Payer: COMMERCIAL

## 2019-10-24 ENCOUNTER — TELEPHONE (OUTPATIENT)
Dept: INTERNAL MEDICINE | Facility: CLINIC | Age: 49
End: 2019-10-24

## 2019-10-24 ENCOUNTER — NURSE TRIAGE (OUTPATIENT)
Dept: ADMINISTRATIVE | Facility: CLINIC | Age: 49
End: 2019-10-24

## 2019-10-24 VITALS
BODY MASS INDEX: 20.49 KG/M2 | HEIGHT: 64 IN | HEART RATE: 73 BPM | RESPIRATION RATE: 16 BRPM | TEMPERATURE: 98 F | DIASTOLIC BLOOD PRESSURE: 68 MMHG | OXYGEN SATURATION: 100 % | WEIGHT: 120 LBS | SYSTOLIC BLOOD PRESSURE: 110 MMHG

## 2019-10-24 DIAGNOSIS — K52.9 GASTROENTERITIS: Primary | ICD-10-CM

## 2019-10-24 LAB
ALBUMIN SERPL BCP-MCNC: 5.2 G/DL (ref 3.5–5.2)
ALP SERPL-CCNC: 70 U/L (ref 55–135)
ALT SERPL W/O P-5'-P-CCNC: 13 U/L (ref 10–44)
ANION GAP SERPL CALC-SCNC: 14 MMOL/L (ref 8–16)
AST SERPL-CCNC: 19 U/L (ref 10–40)
BACTERIA #/AREA URNS AUTO: ABNORMAL /HPF
BASOPHILS # BLD AUTO: 0.05 K/UL (ref 0–0.2)
BASOPHILS NFR BLD: 0.3 % (ref 0–1.9)
BILIRUB SERPL-MCNC: 0.8 MG/DL (ref 0.1–1)
BILIRUB UR QL STRIP: NEGATIVE
BUN SERPL-MCNC: 18 MG/DL (ref 6–20)
CALCIUM SERPL-MCNC: 11.1 MG/DL (ref 8.7–10.5)
CHLORIDE SERPL-SCNC: 104 MMOL/L (ref 95–110)
CLARITY UR REFRACT.AUTO: CLEAR
CO2 SERPL-SCNC: 19 MMOL/L (ref 23–29)
COLOR UR AUTO: YELLOW
CREAT SERPL-MCNC: 1 MG/DL (ref 0.5–1.4)
CREAT SERPL-MCNC: 1.2 MG/DL (ref 0.5–1.4)
DIFFERENTIAL METHOD: ABNORMAL
EOSINOPHIL # BLD AUTO: 0.2 K/UL (ref 0–0.5)
EOSINOPHIL NFR BLD: 1.3 % (ref 0–8)
ERYTHROCYTE [DISTWIDTH] IN BLOOD BY AUTOMATED COUNT: 13.2 % (ref 11.5–14.5)
EST. GFR  (AFRICAN AMERICAN): >60 ML/MIN/1.73 M^2
EST. GFR  (NON AFRICAN AMERICAN): 52.5 ML/MIN/1.73 M^2
GLUCOSE SERPL-MCNC: 155 MG/DL (ref 70–110)
GLUCOSE UR QL STRIP: NEGATIVE
HCT VFR BLD AUTO: 48 % (ref 37–48.5)
HGB BLD-MCNC: 16 G/DL (ref 12–16)
HGB UR QL STRIP: ABNORMAL
HYALINE CASTS UR QL AUTO: 8 /LPF
IMM GRANULOCYTES # BLD AUTO: 0.08 K/UL (ref 0–0.04)
IMM GRANULOCYTES NFR BLD AUTO: 0.5 % (ref 0–0.5)
KETONES UR QL STRIP: ABNORMAL
LEUKOCYTE ESTERASE UR QL STRIP: NEGATIVE
LIPASE SERPL-CCNC: 12 U/L (ref 4–60)
LYMPHOCYTES # BLD AUTO: 1.1 K/UL (ref 1–4.8)
LYMPHOCYTES NFR BLD: 6.8 % (ref 18–48)
MCH RBC QN AUTO: 29.6 PG (ref 27–31)
MCHC RBC AUTO-ENTMCNC: 33.3 G/DL (ref 32–36)
MCV RBC AUTO: 89 FL (ref 82–98)
MICROSCOPIC COMMENT: ABNORMAL
MONOCYTES # BLD AUTO: 0.6 K/UL (ref 0.3–1)
MONOCYTES NFR BLD: 3.6 % (ref 4–15)
NEUTROPHILS # BLD AUTO: 14.3 K/UL (ref 1.8–7.7)
NEUTROPHILS NFR BLD: 87.5 % (ref 38–73)
NITRITE UR QL STRIP: NEGATIVE
NRBC BLD-RTO: 0 /100 WBC
PH UR STRIP: 5 [PH] (ref 5–8)
PLATELET # BLD AUTO: 330 K/UL (ref 150–350)
PMV BLD AUTO: 10 FL (ref 9.2–12.9)
POTASSIUM SERPL-SCNC: 4.1 MMOL/L (ref 3.5–5.1)
PROT SERPL-MCNC: 8.8 G/DL (ref 6–8.4)
PROT UR QL STRIP: NEGATIVE
RBC # BLD AUTO: 5.4 M/UL (ref 4–5.4)
RBC #/AREA URNS AUTO: 3 /HPF (ref 0–4)
SAMPLE: NORMAL
SODIUM SERPL-SCNC: 137 MMOL/L (ref 136–145)
SP GR UR STRIP: 1.02 (ref 1–1.03)
SQUAMOUS #/AREA URNS AUTO: 1 /HPF
URN SPEC COLLECT METH UR: ABNORMAL
WBC # BLD AUTO: 16.36 K/UL (ref 3.9–12.7)
WBC #/AREA URNS AUTO: 1 /HPF (ref 0–5)

## 2019-10-24 PROCEDURE — 85025 COMPLETE CBC W/AUTO DIFF WBC: CPT

## 2019-10-24 PROCEDURE — 63600175 PHARM REV CODE 636 W HCPCS: Performed by: EMERGENCY MEDICINE

## 2019-10-24 PROCEDURE — 99284 EMERGENCY DEPT VISIT MOD MDM: CPT | Mod: ,,, | Performed by: EMERGENCY MEDICINE

## 2019-10-24 PROCEDURE — 82565 ASSAY OF CREATININE: CPT

## 2019-10-24 PROCEDURE — 99284 PR EMERGENCY DEPT VISIT,LEVEL IV: ICD-10-PCS | Mod: ,,, | Performed by: EMERGENCY MEDICINE

## 2019-10-24 PROCEDURE — 99285 EMERGENCY DEPT VISIT HI MDM: CPT | Mod: 25

## 2019-10-24 PROCEDURE — 96374 THER/PROPH/DIAG INJ IV PUSH: CPT

## 2019-10-24 PROCEDURE — 25500020 PHARM REV CODE 255: Performed by: EMERGENCY MEDICINE

## 2019-10-24 PROCEDURE — 96361 HYDRATE IV INFUSION ADD-ON: CPT

## 2019-10-24 PROCEDURE — 81001 URINALYSIS AUTO W/SCOPE: CPT

## 2019-10-24 PROCEDURE — 80053 COMPREHEN METABOLIC PANEL: CPT

## 2019-10-24 PROCEDURE — 83690 ASSAY OF LIPASE: CPT

## 2019-10-24 PROCEDURE — 96375 TX/PRO/DX INJ NEW DRUG ADDON: CPT

## 2019-10-24 RX ORDER — HYDROMORPHONE HYDROCHLORIDE 1 MG/ML
0.5 INJECTION, SOLUTION INTRAMUSCULAR; INTRAVENOUS; SUBCUTANEOUS
Status: COMPLETED | OUTPATIENT
Start: 2019-10-24 | End: 2019-10-24

## 2019-10-24 RX ORDER — ONDANSETRON 4 MG/1
4 TABLET, ORALLY DISINTEGRATING ORAL EVERY 6 HOURS PRN
Qty: 8 TABLET | Refills: 0 | Status: SHIPPED | OUTPATIENT
Start: 2019-10-24 | End: 2023-12-25

## 2019-10-24 RX ORDER — DEXTROAMPHETAMINE SACCHARATE, AMPHETAMINE ASPARTATE MONOHYDRATE, DEXTROAMPHETAMINE SULFATE AND AMPHETAMINE SULFATE 2.5; 2.5; 2.5; 2.5 MG/1; MG/1; MG/1; MG/1
10 CAPSULE, EXTENDED RELEASE ORAL EVERY MORNING
COMMUNITY

## 2019-10-24 RX ORDER — ONDANSETRON 2 MG/ML
4 INJECTION INTRAMUSCULAR; INTRAVENOUS
Status: COMPLETED | OUTPATIENT
Start: 2019-10-24 | End: 2019-10-24

## 2019-10-24 RX ADMIN — SODIUM CHLORIDE 1000 ML: 0.9 INJECTION, SOLUTION INTRAVENOUS at 04:10

## 2019-10-24 RX ADMIN — IOHEXOL 75 ML: 350 INJECTION, SOLUTION INTRAVENOUS at 07:10

## 2019-10-24 RX ADMIN — HYDROMORPHONE HYDROCHLORIDE 0.5 MG: 1 INJECTION, SOLUTION INTRAMUSCULAR; INTRAVENOUS; SUBCUTANEOUS at 03:10

## 2019-10-24 RX ADMIN — ONDANSETRON 4 MG: 2 INJECTION INTRAMUSCULAR; INTRAVENOUS at 03:10

## 2019-10-24 NOTE — ED PROVIDER NOTES
Encounter Date: 10/24/2019    SCRIBE #1 NOTE: I, Michell Colon, am scribing for, and in the presence of,  Dr. Galdino Donald. I have scribed the following portions of the note - Other sections scribed: PE.       History     Chief Complaint   Patient presents with    Emesis     and diarrhea. began this AM.      48 yo W with pmhx depression, SBO (2017, managed non-surgically) presents with a chief complaint of abdominal pain. Patient awoke this morning with abdominal pain. Pain is located in the suprapubic region.  No radiation.  Pain is associated with nausea, vomiting, diarrhea.  Patient reports 8 episodes of vomiting.  Vomiting is nonbloody.  She reports:  List episodes of diarrhea.  Diarrhea is nonbloody.  No recent antibiotic use or travel.  No dysuria, frequency. No suspicious food intake.  Patient did have fast food yesterday.  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  Patient has abdominal surgical history of a fibroid removal greater than 10 years ago.  Patient reports symptoms are similar in quality to previous SBO.        Review of patient's allergies indicates:  No Known Allergies  Past Medical History:   Diagnosis Date    Depression      Past Surgical History:   Procedure Laterality Date    ABDOMINAL SURGERY      HAND SURGERY      TONSILLECTOMY       Family History   Problem Relation Age of Onset    Cancer Mother     Diabetes Father      Social History     Tobacco Use    Smoking status: Never Smoker    Smokeless tobacco: Never Used   Substance Use Topics    Alcohol use: Yes     Comment: social    Drug use: Never     Review of Systems   Constitutional: Negative for fever.   HENT: Negative for congestion.    Eyes: Negative for discharge.   Respiratory: Negative for shortness of breath.    Cardiovascular: Negative for chest pain.   Gastrointestinal: Positive for abdominal distention, abdominal pain, diarrhea, nausea and vomiting. Negative for anal bleeding, blood in stool, constipation  and rectal pain.   Endocrine: Negative for polyuria.   Genitourinary: Negative for dysuria.   Musculoskeletal: Negative for back pain.   Skin: Negative for wound.   Allergic/Immunologic: Negative for immunocompromised state.   Neurological: Negative for headaches.   Hematological: Does not bruise/bleed easily.   Psychiatric/Behavioral: Negative for confusion.       Physical Exam     Initial Vitals [10/24/19 1450]   BP Pulse Resp Temp SpO2   132/82 91 16 97.7 °F (36.5 °C) 99 %      MAP       --         Physical Exam    Nursing note and vitals reviewed.  Constitutional: She appears well-developed and well-nourished. She is not diaphoretic. She appears distressed (mild secondary to pain. ).   HENT:   Head: Normocephalic and atraumatic.   Mouth/Throat: Oropharynx is clear and moist.   Eyes: EOM are normal. Right eye exhibits no discharge. Left eye exhibits no discharge.   Neck: Normal range of motion. Neck supple.   Cardiovascular: Normal rate, regular rhythm, normal heart sounds and intact distal pulses. Exam reveals no gallop and no friction rub.    No murmur heard.  Pulmonary/Chest: Breath sounds normal. No respiratory distress.   Abdominal: Soft. She exhibits distension. There is tenderness (generalized to deep palpation. ). There is no rebound and no guarding.   + Hypoactive bowel sounds    Musculoskeletal: Normal range of motion. She exhibits no tenderness.   Neurological: She is alert and oriented to person, place, and time. She has normal strength.   Skin: Skin is warm and dry. Capillary refill takes 2 to 3 seconds.         ED Course   Procedures  Labs Reviewed   CBC W/ AUTO DIFFERENTIAL - Abnormal; Notable for the following components:       Result Value    WBC 16.36 (*)     Gran # (ANC) 14.3 (*)     Immature Grans (Abs) 0.08 (*)     Gran% 87.5 (*)     Lymph% 6.8 (*)     Mono% 3.6 (*)     All other components within normal limits   COMPREHENSIVE METABOLIC PANEL - Abnormal; Notable for the following components:     CO2 19 (*)     Glucose 155 (*)     Calcium 11.1 (*)     Total Protein 8.8 (*)     eGFR if non  52.5 (*)     All other components within normal limits   URINALYSIS, REFLEX TO URINE CULTURE - Abnormal; Notable for the following components:    Ketones, UA 1+ (*)     Occult Blood UA 2+ (*)     All other components within normal limits    Narrative:     Preferred Collection Type->Urine, Clean Catch   URINALYSIS MICROSCOPIC - Abnormal; Notable for the following components:    Hyaline Casts, UA 8 (*)     All other components within normal limits    Narrative:     Preferred Collection Type->Urine, Clean Catch   LIPASE   ISTAT CREATININE          Imaging Results          CT Abdomen Pelvis With Contrast (Final result)  Result time 10/24/19 19:38:28    Final result by Michelle Phelps MD (10/24/19 19:38:28)                 Impression:      No inflammatory process or bowel dilation.  No urolithiasis or hydronephrosis.      Electronically signed by: Michelle Phelps  Date:    10/24/2019  Time:    19:38             Narrative:    EXAMINATION:  CT ABDOMEN PELVIS WITH CONTRAST    CLINICAL HISTORY:  Nausea, vomiting, diarrhea;    TECHNIQUE:  Low dose axial images, sagittal and coronal reformations were obtained from the lung bases to the pubic symphysis following the IV administration of 75 mL of Omnipaque 350 and the oral administration of 30 mL of Omnipaque 350.    COMPARISON:  CT scan 05/13/2017    FINDINGS:  Chest: Visualized heart appears normal.  No pericardial effusion.  The visualized esophagus and thoracic aorta appears normal.  No pneumothorax or pleural effusion or interstitial edema seen.    Abdomen: The visualized liver, spleen, pancreas, adrenal glands appear normal.  The patent veins and portal veins and SMV and splenic vein show normal enhancement.  The abdominal aorta has atherosclerotic calcification without enlargement or dissection.  The IVC appears normal.  No periaortic adenopathy.    The  kidneys enhance symmetrically and appear normal.  No perinephric stranding.  No ureteral dilation or calculus.    Pelvis: The urinary bladder is essentially empty best seen on the sagittal images.  No inguinal hernia or pelvic adenopathy found.  The uterus and adnexal regions are grossly normal.  The cecum lies anterior to the urinary bladder and uterus.  The terminal ileum appears normal axial image 66.  The appendix is not identified.  No pericecal inflammatory process found.    Bowel and mesentery: The cecum and terminal ileum appear normal and lie anterior to the uterus and urinary bladder.  No pattern of ileus or obstruction.  No pneumatosis or wall thickening.  No abscess or free air or free fluid found.                                 Medical Decision Making:   History:   Old Medical Records: I decided to obtain old medical records.  Initial Assessment:   50 yo W with pmhx depression, SBO (2017, managed non-surgically) presents with a chief complaint of abdominal pain.  Differential Diagnosis:   UTI, pyelo, nephrolithiasis, SBO, appendicitis, gastroenteritis  Clinical Tests:   Lab Tests: Ordered  Radiological Study: Ordered  ED Management:  Will obtain labs and CT abdomen/pelvis.  Will administer IV fluids, antiemetics, and analgesics.    5:29 PM  Reassessment: There is leukocytosis at 16.4. Hypercalcemia at 11.1. Creatinine of 1.2. Lipase is normal.     7:12 PM  Reassessment: UA with 1+ ketones, 2+ blood, and negative for infection. Awaiting CT.     7:53 PM  Reassessment:  CT negative for acute process.  On repeat assessment, patient resting comfortably in no acute distress. She is tolerating p.o. at this time.  Abdomen without any significant tenderness.  Symptoms overall consistent with gastroenteritis.  Patient provided with instructions for symptomatic treatment and oral rehydration.  Script for Zofran provided. Return precautions.            Scribe Attestation:   Scribe #1: I performed the above scribed  service and the documentation accurately describes the services I performed. I attest to the accuracy of the note.    Attending Attestation:           Physician Attestation for Scribe:      Comments: I, Dr. Galdino Donald, personally performed the services described in this documentation. All medical record entries made by the scribe were at my direction and in my presence.  I have reviewed the chart and agree that the record reflects my personal performance and is accurate and complete. Galdino Donald MD.  7:54 PM 10/24/2019                 Clinical Impression:       ICD-10-CM ICD-9-CM   1. Gastroenteritis K52.9 558.9                                Galdino Donald MD  10/24/19 1954

## 2019-10-24 NOTE — ED TRIAGE NOTES
Jennifer Caballero, dayan 49 y.o. female presents to the ED w/ complaint of nausea, vomiting, diarrhea starting this morning. Gen abd pain and SOB. Hx of bowel obstruction in 2017.     Triage note:  Chief Complaint   Patient presents with    Emesis     and diarrhea. began this AM.      Review of patient's allergies indicates:  No Known Allergies  Past Medical History:   Diagnosis Date    Depression      Adult Physical Assessment  LOC: Jennifer Caballero, 49 y.o. female verified via two identifiers.  The patient is awake, alert, oriented and speaking appropriately at this time.  APPEARANCE: Patient resting comfortably and appears to be in no acute distress at this time. Patient is clean and well groomed, patient's clothing is properly fastened.  SKIN:The skin is warm and dry, color consistent with ethnicity, patient has normal skin turgor and moist mucus membranes, skin intact, no breakdown or brusing noted.  MUSCULOSKELETAL: Patient moving all extremities well, no obvious swelling or deformities noted. Gen weakness and fatigue  RESPIRATORY: Airway is open and patent, respirations are spontaneous, patient has a normal effort and rate, no accessory muscle use noted. SOB  CARDIAC: Patient has a normal rate and rhythm, no periphreal edema noted in any extremity, capillary refill < 3 seconds in all extremities  ABDOMEN: Soft and tender to palpation, no abdominal distention noted. Bowel sounds present in all four quadrants. NVD + Denies dysuria  NEUROLOGIC: Eyes open spontaneously, behavior appropriate to situation, follows commands, facial expression symmetrical, bilateral hand grasp equal and even, purposeful motor response noted, normal sensation in all extremities when touched with a finger.

## 2019-10-24 NOTE — TELEPHONE ENCOUNTER
Reason for Disposition   Constant abdominal pain lasting > 2 hours    Additional Information   Negative: Passed out (i.e., fainted, collapsed and was not responding)   Negative: Shock suspected (e.g., cold/pale/clammy skin, too weak to stand, low BP, rapid pulse)   Negative: Sounds like a life-threatening emergency to the triager   Negative: Chest pain   Negative: Pain is mainly in upper abdomen (if needed ask: 'is it mainly above the belly button?')   Negative: Abdominal pain and pregnant > 20 weeks   Negative: Abdominal pain and pregnant < 20 weeks   Negative: SEVERE abdominal pain (e.g., excruciating)   Negative: Vomiting red blood or black (coffee ground) material   Negative: Bloody, black, or tarry bowel movements    Protocols used: ABDOMINAL PAIN - FEMALE-A-OH    Pt stated she has been having constant abdominal pain 6/10 since she woke up this morning. Pt stated she vomited 4 times and has a hx of small bowel obstruction 2 years ago. Per triage protocol, advised to go to ED.

## 2019-10-24 NOTE — TELEPHONE ENCOUNTER
----- Message from Chris Banerjee sent at 10/24/2019  1:37 PM CDT -----  Contact: Pt [phone]  Patient would like to get medical advice.  Symptoms (please be specific):  Diarrhea, abdominal cramps (Level 5 or 6) and vomiting  How long has patient had these symptoms:  1 day  Pharmacy name and phone # [id number] - [place], [place] - [address] [place] [address] AT [facility] [address] [phone] (Phone) [phone] (Fax)    Comments:  I patched her thru to the nurse on call line due to the level of stomach pain

## 2019-10-25 NOTE — DISCHARGE INSTRUCTIONS
Drink plenty of fluids.  Take Zofran as needed for nausea.  Keep yourself hydrated.  Return to the emergency department for any inability to tolerate food by mouth, severe pain, or other concerning symptoms.

## 2019-10-25 NOTE — ED NOTES
Report received from MILI Demarco. Care assumed. Pt resting comfortably and independently repositioned in stretcher with bed locked in lowest position for safety. NAD and patient states she feels a little better. Respirations even and unlabored and visible chest rise noted. Patient offered bathroom assistance and denies need at this time. Pt instructed to call if assistance is needed. No needs at this time. Will continue to monitor. Family at bedside.

## 2019-12-05 ENCOUNTER — OFFICE VISIT (OUTPATIENT)
Dept: URGENT CARE | Facility: CLINIC | Age: 49
End: 2019-12-05
Payer: COMMERCIAL

## 2019-12-05 VITALS
HEART RATE: 64 BPM | WEIGHT: 110 LBS | SYSTOLIC BLOOD PRESSURE: 147 MMHG | TEMPERATURE: 99 F | OXYGEN SATURATION: 98 % | HEIGHT: 64 IN | RESPIRATION RATE: 18 BRPM | DIASTOLIC BLOOD PRESSURE: 67 MMHG | BODY MASS INDEX: 18.78 KG/M2

## 2019-12-05 DIAGNOSIS — R11.0 NAUSEA: Primary | ICD-10-CM

## 2019-12-05 DIAGNOSIS — B34.9 VIRAL SYNDROME: ICD-10-CM

## 2019-12-05 PROCEDURE — 99214 OFFICE O/P EST MOD 30 MIN: CPT | Mod: S$GLB,,, | Performed by: NURSE PRACTITIONER

## 2019-12-05 PROCEDURE — 99214 PR OFFICE/OUTPT VISIT, EST, LEVL IV, 30-39 MIN: ICD-10-PCS | Mod: S$GLB,,, | Performed by: NURSE PRACTITIONER

## 2019-12-05 RX ORDER — ONDANSETRON 4 MG/1
4 TABLET, FILM COATED ORAL EVERY 6 HOURS PRN
Qty: 18 TABLET | Refills: 0 | Status: SHIPPED | OUTPATIENT
Start: 2019-12-05 | End: 2023-12-25

## 2019-12-05 NOTE — PATIENT INSTRUCTIONS
"Flu tested NEGATIVE    Go to the Emergency Department for any new or worsening symptoms including: worsening abdominal pain, dark\black\bloody bowel movements, vomiting blood, hard abdomen, fever, chest pain, shortness of breath, loss of consciousness or any other concerns.    If you were prescribed a narcotic or controlled medication, do not drive or operate heavy equipment or machinery while taking these medications.    You must understand that you've received an Urgent Care treatment only and that you may be released before all your medical problems are known or treated. You, the patient, will arrange for follow up care as instructed.    Follow up with your PCP or specialty clinic as directed within 2-5 days if not improved or as needed.  You can call [phone] to schedule an appointment with the appropriate provider.      Viral Syndrome (Adult)  A viral illness may cause a number of symptoms. The symptoms depend on the part of the body that the virus affects. If it settles in your nose, throat, and lungs, it may cause cough, sore throat, congestion, and sometimes headache. If it settles in your stomach and intestinal tract, it may cause vomiting and diarrhea. Sometimes it causes vague symptoms like "aching all over," feeling tired, loss of appetite, or fever.  A viral illness usually lasts 1 to 2 weeks, but sometimes it lasts longer. In some cases, a more serious infection can look like a viral syndrome in the first few days of the illness. You may need another exam and additional tests to know the difference. Watch for the warning signs listed below.  Home care  Follow these guidelines for taking care of yourself at home:  · If symptoms are severe, rest at home for the first 2 to 3 days.  · Stay away from cigarette smoke - both your smoke and the smoke from others.  · You may use over-the-counter acetaminophen or ibuprofen for fever, muscle aching, and headache, unless another medicine was prescribed for " this. If you have chronic liver or kidney disease or ever had a stomach ulcer or GI bleeding, talk with your doctor before using these medicines. No one who is younger than 18 and ill with a fever should take aspirin. It may cause severe disease or death.  · Your appetite may be poor, so a light diet is fine. Avoid dehydration by drinking 8 to 12 8-ounce glasses of fluids each day. This may include water; orange juice; lemonade; apple, grape, and cranberry juice; clear fruit drinks; electrolyte replacement and sports drinks; and decaffeinated teas and coffee. If you have been diagnosed with a kidney disease, ask your doctor how much and what types of fluids you should drink to prevent dehydration. If you have kidney disease, drinking too much fluid can cause it build up in the your body and be dangerous to your health.  · Over-the-counter remedies won't shorten the length of the illness but may be helpful for cough, sore throat; and nasal and sinus congestion. Don't use decongestants if you have high blood pressure.  Follow-up care  Follow up with your healthcare provider if you do not improve over the next week.  Call 911  Get emergency medical care if any of the following occur:  · Convulsion  · Feeling weak, dizzy, or like you are going to faint  · Chest pain, shortness of breath, wheezing, or difficulty breathing  When to seek medical advice  Call your healthcare provider right away if any of these occur:  · Cough with lots of colored sputum (mucus) or blood in your sputum  · Chest pain, shortness of breath, wheezing, or difficulty breathing  · Severe headache; face, neck, or ear pain  · Severe, constant pain in the lower right side of your belly (abdominal)  · Continued vomiting (cant keep liquids down)  · Frequent diarrhea (more than 5 times a day); blood (red or black color) or mucus in diarrhea  · Feeling weak, dizzy, or like you are going to faint  · Extreme thirst  · Fever of 100.4°F (38°C) or higher, or  as directed by your healthcare provider  Date Last Reviewed: 9/25/2015  © 0333-4706 [employer]. [address] [place] [address], [place] [address]. All rights reserved. This information is not intended as a substitute for professional medical care. Always follow your healthcare professional's instructions.        Diet for Vomiting or Diarrhea (Adult)    Your symptoms may return or get worse after eating certain foods listed below. If this happens, stop eating these foods until your symptoms ease and you feel better.  Once the vomiting stops, follow the steps below.   During the first 12 to 24 hours  During the first 12 to 24 hours, follow this diet:  · Drinks. Plain water, sport drinks like electrolyte solutions, soft drinks without caffeine, mineral water (plain or flavored), clear fruit juices, and decaffeinated tea and coffee.  · Soups. Clear broth.  · Desserts. Plain gelatin, popsicles, and fruit juice bars. As you feel better, you may add 6 to 8 ounces of yogurt per day. If you have diarrhea, don't have foods or drinks that contain sugar, high-fructose corn syrup, or sugar alcohols.  During the next 24 hours  During the next 24 hours you may add the following to the above:  · Hot cereal, plain toast, bread, rolls, and crackers  · Plain noodles, rice, mashed potatoes, and chicken noodle or rice soup  · Unsweetened canned fruit (but not pineapple) and bananas  Don't eat more than 15 grams of fat a day. Do this by staying away from margarine, butter, oils, mayonnaise, sauces, gravies, fried foods, peanut butter, meat, poultry, and fish.  Don't eat much fiber. Stay away from raw or cooked vegetables, fresh fruits (except bananas), and bran cereals.  Limit how much caffeine and chocolate you have. Do not use any spices or seasonings except salt.  During the next 24 hours  Slowly go back to your normal diet, as you feel better and your symptoms ease.  Date Last Reviewed: 8/1/2016  © 6231-4623 [employer]. [address] [place] [address], [place] [address]. All rights reserved. This information is not intended as a substitute for professional medical care. Always follow your healthcare professional's instructions.

## 2019-12-05 NOTE — PROGRESS NOTES
"Subjective:       Patient ID: Jennifer Caballero is a 49 y.o. female.    Vitals:  height is 5' 4" (1.626 m) and weight is 49.9 kg (110 lb). Her tympanic temperature is 98.5 °F (36.9 °C). Her blood pressure is 147/67 (abnormal) and her pulse is 64. Her respiration is 18 and oxygen saturation is 98%.     Chief Complaint: Nausea    Patient presents with c.o nausea that started today. Patient complains of mild congestion. No cough. Wants to be tested for the flu.     Nausea   This is a new problem. The current episode started today. The problem occurs intermittently. The problem has been unchanged. Associated symptoms include congestion, myalgias and nausea. Pertinent negatives include no chills, coughing, diaphoresis, fatigue, fever, rash, sore throat or vomiting. She has tried nothing for the symptoms.       Constitution: Negative for chills, sweating, fatigue and fever.   HENT: Positive for congestion. Negative for ear pain, sinus pain, sinus pressure, sore throat and voice change.    Neck: Negative for painful lymph nodes.   Eyes: Negative for eye redness.   Respiratory: Negative for chest tightness, cough, sputum production, bloody sputum, COPD, shortness of breath, stridor, wheezing and asthma.    Gastrointestinal: Positive for nausea. Negative for vomiting.   Musculoskeletal: Positive for muscle ache.   Skin: Negative for rash.   Allergic/Immunologic: Positive for sneezing. Negative for seasonal allergies and asthma.   Hematologic/Lymphatic: Negative for swollen lymph nodes.       Objective:      Physical Exam   Constitutional: She is oriented to person, place, and time. Vital signs are normal. She appears well-developed and well-nourished. She is active and cooperative.  Non-toxic appearance. She does not have a sickly appearance. She does not appear ill. No distress.   HENT:   Head: Normocephalic and atraumatic.   Right Ear: Hearing, tympanic membrane, abnromal external ear and ear canal normal.   Left Ear: Hearing, " tympanic membrane, abnormal external ear and ear canal normal.   Nose: Nose abnormal. No mucosal edema, rhinorrhea or nasal deformity. No epistaxis. Right sinus exhibits no maxillary sinus tenderness and no frontal sinus tenderness. Left sinus exhibits no maxillary sinus tenderness and no frontal sinus tenderness.   Mouth/Throat: Uvula is midline, oropharynx is clear and moist and mucous membranes are normal. No trismus in the jaw. Normal dentition. No uvula swelling. No oropharyngeal exudate, posterior oropharyngeal edema or posterior oropharyngeal erythema. Tonsils are 2+ on the right. Tonsils are 2+ on the left. No tonsillar exudate.   Eyes: Pupils are equal, round, and reactive to light. Conjunctivae, EOM and lids are normal. No scleral icterus.   Neck: Trachea normal, full passive range of motion without pain and phonation normal. Neck supple. No neck rigidity. No edema and no erythema present.   Cardiovascular: Normal rate, regular rhythm, normal heart sounds, intact distal pulses and normal pulses.   Pulses:       Radial pulses are 2+ on the right side, and 2+ on the left side.   Pulmonary/Chest: Effort normal and breath sounds normal. No respiratory distress. She has no decreased breath sounds. She has no rhonchi.   Abdominal: Soft. Normal appearance and bowel sounds are normal. There is no tenderness. There is no rigidity, no rebound, no guarding, no CVA tenderness, no tenderness at McBurney's point and negative Oliva's sign.   Musculoskeletal: Normal range of motion. She exhibits no edema or deformity.   Neurological: She is alert and oriented to person, place, and time. She exhibits normal muscle tone. Coordination and gait normal.   Skin: Skin is warm, dry, intact, not diaphoretic and not pale. Capillary refill takes less than 2 seconds.   Psychiatric: She has a normal mood and affect. Her speech is normal and behavior is normal. Judgment and thought content normal. Cognition and memory are normal.  "  Nursing note and vitals reviewed.        Assessment:       1. Nausea    2. Viral syndrome      Flu Negative  Plan:         Nausea  -     ondansetron (ZOFRAN) 4 MG tablet; Take 1 tablet (4 mg total) by mouth every 6 (six) hours as needed.  Dispense: 18 tablet; Refill: 0    Viral syndrome      Patient Instructions   Go to the Emergency Department for any new or worsening symptoms including: worsening abdominal pain, dark\black\bloody bowel movements, vomiting blood, hard abdomen, fever, chest pain, shortness of breath, loss of consciousness or any other concerns.    If you were prescribed a narcotic or controlled medication, do not drive or operate heavy equipment or machinery while taking these medications.    You must understand that you've received an Urgent Care treatment only and that you may be released before all your medical problems are known or treated. You, the patient, will arrange for follow up care as instructed.    Follow up with your PCP or specialty clinic as directed within 2-5 days if not improved or as needed.  You can call [phone] to schedule an appointment with the appropriate provider.      Viral Syndrome (Adult)  A viral illness may cause a number of symptoms. The symptoms depend on the part of the body that the virus affects. If it settles in your nose, throat, and lungs, it may cause cough, sore throat, congestion, and sometimes headache. If it settles in your stomach and intestinal tract, it may cause vomiting and diarrhea. Sometimes it causes vague symptoms like "aching all over," feeling tired, loss of appetite, or fever.  A viral illness usually lasts 1 to 2 weeks, but sometimes it lasts longer. In some cases, a more serious infection can look like a viral syndrome in the first few days of the illness. You may need another exam and additional tests to know the difference. Watch for the warning signs listed below.  Home care  Follow these guidelines for taking care of yourself at " home:  · If symptoms are severe, rest at home for the first 2 to 3 days.  · Stay away from cigarette smoke - both your smoke and the smoke from others.  · You may use over-the-counter acetaminophen or ibuprofen for fever, muscle aching, and headache, unless another medicine was prescribed for this. If you have chronic liver or kidney disease or ever had a stomach ulcer or GI bleeding, talk with your doctor before using these medicines. No one who is younger than 18 and ill with a fever should take aspirin. It may cause severe disease or death.  · Your appetite may be poor, so a light diet is fine. Avoid dehydration by drinking 8 to 12 8-ounce glasses of fluids each day. This may include water; orange juice; lemonade; apple, grape, and cranberry juice; clear fruit drinks; electrolyte replacement and sports drinks; and decaffeinated teas and coffee. If you have been diagnosed with a kidney disease, ask your doctor how much and what types of fluids you should drink to prevent dehydration. If you have kidney disease, drinking too much fluid can cause it build up in the your body and be dangerous to your health.  · Over-the-counter remedies won't shorten the length of the illness but may be helpful for cough, sore throat; and nasal and sinus congestion. Don't use decongestants if you have high blood pressure.  Follow-up care  Follow up with your healthcare provider if you do not improve over the next week.  Call 911  Get emergency medical care if any of the following occur:  · Convulsion  · Feeling weak, dizzy, or like you are going to faint  · Chest pain, shortness of breath, wheezing, or difficulty breathing  When to seek medical advice  Call your healthcare provider right away if any of these occur:  · Cough with lots of colored sputum (mucus) or blood in your sputum  · Chest pain, shortness of breath, wheezing, or difficulty breathing  · Severe headache; face, neck, or ear pain  · Severe, constant pain in the lower  right side of your belly (abdominal)  · Continued vomiting (cant keep liquids down)  · Frequent diarrhea (more than 5 times a day); blood (red or black color) or mucus in diarrhea  · Feeling weak, dizzy, or like you are going to faint  · Extreme thirst  · Fever of 100.4°F (38°C) or higher, or as directed by your healthcare provider  Date Last Reviewed: 9/25/2015  © 2332-8339 [facility] [facility]. [address] [place] [address]. All rights reserved. This information is not intended as a substitute for professional medical care. Always follow your healthcare professional's instructions.        Diet for Vomiting or Diarrhea (Adult)    Your symptoms may return or get worse after eating certain foods listed below. If this happens, stop eating these foods until your symptoms ease and you feel better.  Once the vomiting stops, follow the steps below.   During the first 12 to 24 hours  During the first 12 to 24 hours, follow this diet:  · Drinks. Plain water, sport drinks like electrolyte solutions, soft drinks without caffeine, mineral water (plain or flavored), clear fruit juices, and decaffeinated tea and coffee.  · Soups. Clear broth.  · Desserts. Plain gelatin, popsicles, and fruit juice bars. As you feel better, you may add 6 to 8 ounces of yogurt per day. If you have diarrhea, don't have foods or drinks that contain sugar, high-fructose corn syrup, or sugar alcohols.  During the next 24 hours  During the next 24 hours you may add the following to the above:  · Hot cereal, plain toast, bread, rolls, and crackers  · Plain noodles, rice, mashed potatoes, and chicken noodle or rice soup  · Unsweetened canned fruit (but not pineapple) and bananas  Don't eat more than 15 grams of fat a day. Do this by staying away from margarine, butter, oils, mayonnaise, sauces, gravies, fried foods, peanut butter, meat, poultry, and fish.  Don't eat much fiber. Stay away from raw or cooked vegetables, fresh fruits  (except bananas), and bran cereals.  Limit how much caffeine and chocolate you have. Do not use any spices or seasonings except salt.  During the next 24 hours  Slowly go back to your normal diet, as you feel better and your symptoms ease.  Date Last Reviewed: 8/1/2016  © 8587-4744 [employer]. [address] [place] [address], [place] [address]. All rights reserved. This information is not intended as a substitute for professional medical care. Always follow your healthcare professional's instructions.

## 2019-12-08 ENCOUNTER — HOSPITAL ENCOUNTER (EMERGENCY)
Facility: HOSPITAL | Age: 49
Discharge: HOME | End: 2019-12-08
Attending: EMERGENCY MEDICINE
Payer: COMMERCIAL

## 2019-12-08 ENCOUNTER — NURSE TRIAGE (OUTPATIENT)
Dept: ADMINISTRATIVE | Facility: CLINIC | Age: 49
End: 2019-12-08

## 2019-12-08 VITALS
BODY MASS INDEX: 18.78 KG/M2 | SYSTOLIC BLOOD PRESSURE: 139 MMHG | TEMPERATURE: 98 F | DIASTOLIC BLOOD PRESSURE: 89 MMHG | HEIGHT: 64 IN | WEIGHT: 110 LBS | HEART RATE: 79 BPM | RESPIRATION RATE: 18 BRPM | OXYGEN SATURATION: 100 %

## 2019-12-08 VITALS
SYSTOLIC BLOOD PRESSURE: 149 MMHG | OXYGEN SATURATION: 100 % | WEIGHT: 110 LBS | HEART RATE: 68 BPM | HEIGHT: 64 IN | TEMPERATURE: 99 F | BODY MASS INDEX: 18.78 KG/M2 | DIASTOLIC BLOOD PRESSURE: 83 MMHG | RESPIRATION RATE: 18 BRPM

## 2019-12-08 DIAGNOSIS — M79.2 NEUROPATHIC PAIN: ICD-10-CM

## 2019-12-08 DIAGNOSIS — B02.8 HERPES ZOSTER WITH COMPLICATION: Primary | ICD-10-CM

## 2019-12-08 DIAGNOSIS — B02.8 HERPES ZOSTER WITH OTHER COMPLICATION: Primary | ICD-10-CM

## 2019-12-08 DIAGNOSIS — S46.911A STRAIN OF RIGHT SHOULDER, INITIAL ENCOUNTER: ICD-10-CM

## 2019-12-08 LAB
BUN SERPL-MCNC: 14 MG/DL (ref 6–30)
CHLORIDE SERPL-SCNC: 106 MMOL/L (ref 95–110)
CREAT SERPL-MCNC: 0.6 MG/DL (ref 0.5–1.4)
GLUCOSE SERPL-MCNC: 162 MG/DL (ref 70–110)
HCT VFR BLD CALC: 41 %PCV (ref 36–54)
POC IONIZED CALCIUM: 1.13 MMOL/L (ref 1.06–1.42)
POC TCO2 (MEASURED): 24 MMOL/L (ref 23–29)
POTASSIUM BLD-SCNC: 4 MMOL/L (ref 3.5–5.1)
SAMPLE: ABNORMAL
SODIUM BLD-SCNC: 143 MMOL/L (ref 136–145)

## 2019-12-08 PROCEDURE — 63600175 PHARM REV CODE 636 W HCPCS: Performed by: EMERGENCY MEDICINE

## 2019-12-08 PROCEDURE — 99284 EMERGENCY DEPT VISIT MOD MDM: CPT | Mod: 25,27

## 2019-12-08 PROCEDURE — 99284 PR EMERGENCY DEPT VISIT,LEVEL IV: ICD-10-PCS | Mod: 25,,, | Performed by: EMERGENCY MEDICINE

## 2019-12-08 PROCEDURE — 99284 PR EMERGENCY DEPT VISIT,LEVEL IV: ICD-10-PCS | Mod: ,,, | Performed by: PHYSICIAN ASSISTANT

## 2019-12-08 PROCEDURE — 96374 THER/PROPH/DIAG INJ IV PUSH: CPT | Mod: 59

## 2019-12-08 PROCEDURE — 25000003 PHARM REV CODE 250: Performed by: EMERGENCY MEDICINE

## 2019-12-08 PROCEDURE — 99284 EMERGENCY DEPT VISIT MOD MDM: CPT | Mod: ,,, | Performed by: PHYSICIAN ASSISTANT

## 2019-12-08 PROCEDURE — 25500020 PHARM REV CODE 255: Performed by: EMERGENCY MEDICINE

## 2019-12-08 PROCEDURE — 63600175 PHARM REV CODE 636 W HCPCS: Performed by: PHYSICIAN ASSISTANT

## 2019-12-08 PROCEDURE — 99284 EMERGENCY DEPT VISIT MOD MDM: CPT | Mod: 25,,, | Performed by: EMERGENCY MEDICINE

## 2019-12-08 PROCEDURE — 25000003 PHARM REV CODE 250: Performed by: PHYSICIAN ASSISTANT

## 2019-12-08 PROCEDURE — 96375 TX/PRO/DX INJ NEW DRUG ADDON: CPT

## 2019-12-08 PROCEDURE — 99285 EMERGENCY DEPT VISIT HI MDM: CPT | Mod: 25

## 2019-12-08 RX ORDER — GABAPENTIN 300 MG/1
300 CAPSULE ORAL
Status: COMPLETED | OUTPATIENT
Start: 2019-12-08 | End: 2019-12-08

## 2019-12-08 RX ORDER — VALACYCLOVIR HYDROCHLORIDE 500 MG/1
1000 TABLET, FILM COATED ORAL
Status: COMPLETED | OUTPATIENT
Start: 2019-12-08 | End: 2019-12-08

## 2019-12-08 RX ORDER — KETOROLAC TROMETHAMINE 30 MG/ML
15 INJECTION, SOLUTION INTRAMUSCULAR; INTRAVENOUS
Status: DISCONTINUED | OUTPATIENT
Start: 2019-12-08 | End: 2019-12-08

## 2019-12-08 RX ORDER — GABAPENTIN 300 MG/1
300 CAPSULE ORAL 3 TIMES DAILY
Qty: 90 CAPSULE | Refills: 11 | Status: SHIPPED | OUTPATIENT
Start: 2019-12-08 | End: 2019-12-08 | Stop reason: SDUPTHER

## 2019-12-08 RX ORDER — HYDROMORPHONE HYDROCHLORIDE 1 MG/ML
0.5 INJECTION, SOLUTION INTRAMUSCULAR; INTRAVENOUS; SUBCUTANEOUS
Status: COMPLETED | OUTPATIENT
Start: 2019-12-08 | End: 2019-12-08

## 2019-12-08 RX ORDER — PROMETHAZINE HYDROCHLORIDE 25 MG/1
25 TABLET ORAL
Status: COMPLETED | OUTPATIENT
Start: 2019-12-08 | End: 2019-12-08

## 2019-12-08 RX ORDER — PROMETHAZINE HYDROCHLORIDE 25 MG/1
25 TABLET ORAL EVERY 6 HOURS PRN
Qty: 20 TABLET | Refills: 0 | Status: SHIPPED | OUTPATIENT
Start: 2019-12-08 | End: 2023-12-25

## 2019-12-08 RX ORDER — NAPROXEN 500 MG/1
500 TABLET ORAL 2 TIMES DAILY WITH MEALS
Qty: 60 TABLET | Refills: 0 | Status: SHIPPED | OUTPATIENT
Start: 2019-12-08

## 2019-12-08 RX ORDER — KETOROLAC TROMETHAMINE 30 MG/ML
15 INJECTION, SOLUTION INTRAMUSCULAR; INTRAVENOUS
Status: COMPLETED | OUTPATIENT
Start: 2019-12-08 | End: 2019-12-08

## 2019-12-08 RX ORDER — HYDROCODONE BITARTRATE AND ACETAMINOPHEN 5; 325 MG/1; MG/1
1 TABLET ORAL
Status: DISCONTINUED | OUTPATIENT
Start: 2019-12-08 | End: 2019-12-08

## 2019-12-08 RX ORDER — NAPROXEN 500 MG/1
500 TABLET ORAL 2 TIMES DAILY WITH MEALS
Qty: 60 TABLET | Refills: 0 | Status: SHIPPED | OUTPATIENT
Start: 2019-12-08 | End: 2019-12-08 | Stop reason: SDUPTHER

## 2019-12-08 RX ORDER — GABAPENTIN 300 MG/1
300 CAPSULE ORAL 3 TIMES DAILY
Qty: 90 CAPSULE | Refills: 0 | Status: SHIPPED | OUTPATIENT
Start: 2019-12-08 | End: 2020-01-07

## 2019-12-08 RX ORDER — VALACYCLOVIR HYDROCHLORIDE 1 G/1
1000 TABLET, FILM COATED ORAL 3 TIMES DAILY
Qty: 21 TABLET | Refills: 0 | Status: SHIPPED | OUTPATIENT
Start: 2019-12-08 | End: 2023-12-25

## 2019-12-08 RX ORDER — PROMETHAZINE HYDROCHLORIDE 25 MG/1
25 TABLET ORAL EVERY 6 HOURS PRN
Qty: 20 TABLET | Refills: 0 | Status: SHIPPED | OUTPATIENT
Start: 2019-12-08 | End: 2019-12-08 | Stop reason: SDUPTHER

## 2019-12-08 RX ORDER — DIAZEPAM 10 MG/2ML
2 INJECTION INTRAMUSCULAR
Status: DISCONTINUED | OUTPATIENT
Start: 2019-12-08 | End: 2019-12-08

## 2019-12-08 RX ADMIN — VALACYCLOVIR HYDROCHLORIDE 1000 MG: 500 TABLET, FILM COATED ORAL at 05:12

## 2019-12-08 RX ADMIN — IOHEXOL 75 ML: 350 INJECTION, SOLUTION INTRAVENOUS at 05:12

## 2019-12-08 RX ADMIN — KETOROLAC TROMETHAMINE 15 MG: 30 INJECTION, SOLUTION INTRAMUSCULAR; INTRAVENOUS at 05:12

## 2019-12-08 RX ADMIN — GABAPENTIN 300 MG: 300 CAPSULE ORAL at 01:12

## 2019-12-08 RX ADMIN — HYDROMORPHONE HYDROCHLORIDE 0.5 MG: 1 INJECTION, SOLUTION INTRAMUSCULAR; INTRAVENOUS; SUBCUTANEOUS at 06:12

## 2019-12-08 RX ADMIN — PROMETHAZINE HYDROCHLORIDE 25 MG: 25 TABLET ORAL at 01:12

## 2019-12-08 NOTE — TELEPHONE ENCOUNTER
Reason for call:  Shoulder pain  Advised per protocol    Reason for Disposition   [1] SEVERE pain AND [2] not improved 2 hours after pain medicine    Protocols used: SHOULDER PAIN-A-AH

## 2019-12-08 NOTE — ED PROVIDER NOTES
"Encounter Date: 12/8/2019       History     Chief Complaint   Patient presents with    Multiple Complaints     Pt states "I was working on my trees last monday and I hurt my shoulder" Pt c/o right shoulder pain with radiation to neck and back. No obvious deformity. Pt reports pain with no movement. Pt also reports hives to RUE after going to chiropracter for pain. Pt states "I don't care about the rash".     Patient is a 49 year old female with PMHX of depression. She presents to the ED for shoulder pain. She reports having right shoulder pain for approximately five days. Describes pain as constant and aching. Rates pain 10/10. Denies relief of pain with naprosyn and ibuprofen. Denies recent falls or trauma. Reports using hedger to trim trees over previous weekend. Also, reports having rash over right arm for approximately three days. Denies hx of shingles. Patient reports undergoing treatment at chiropractor for right shoulder pain. Patient reports having "electrodes" placed to right shoulder and undergoing cervical manipulation/adjustment. She denies fever,chills, nausea, vomiting, sob, chest pain, abd pain, dysuria, diarrhea, or constipation. She is a non smoker and reports alcohol use.    The history is provided by the patient and medical records. No  was used.     Review of patient's allergies indicates:  No Known Allergies  Past Medical History:   Diagnosis Date    Depression      Past Surgical History:   Procedure Laterality Date    ABDOMINAL SURGERY      HAND SURGERY      TONSILLECTOMY       Family History   Problem Relation Age of Onset    Cancer Mother     Diabetes Father      Social History     Tobacco Use    Smoking status: Never Smoker    Smokeless tobacco: Never Used   Substance Use Topics    Alcohol use: Yes     Comment: social    Drug use: Never     Review of Systems   Constitutional: Negative for fever.   HENT: Negative for sore throat.    Respiratory: Negative for " shortness of breath.    Cardiovascular: Negative for chest pain.   Gastrointestinal: Negative for abdominal pain, nausea and vomiting.   Genitourinary: Negative for dysuria.   Musculoskeletal: Positive for myalgias. Negative for back pain.   Skin: Positive for rash.   Neurological: Negative for weakness.   Hematological: Does not bruise/bleed easily.       Physical Exam     Initial Vitals [12/08/19 0359]   BP Pulse Resp Temp SpO2   134/64 71 18 97.5 °F (36.4 °C) 100 %      MAP       --         Physical Exam    Vitals reviewed.  Constitutional: She appears well-developed and well-nourished. No distress.   HENT:   Head: Normocephalic.   Eyes: Conjunctivae are normal.   Neck: Normal range of motion.   Cardiovascular: Normal rate and regular rhythm.   No murmur heard.  Pulses:       Radial pulses are 2+ on the right side, and 2+ on the left side.   Pulmonary/Chest: Breath sounds normal. No respiratory distress. She has no wheezes. She has no rales.   Musculoskeletal: Normal range of motion.        Right shoulder: She exhibits normal range of motion, no tenderness, no bony tenderness, no swelling, no pain, normal pulse and normal strength.   Negative empty can test b/l.    Neurological: She is alert and oriented to person, place, and time. She has normal strength. No sensory deficit.   Motor strength of b/l UE 5/5.   Skin: Skin is warm and dry. Rash noted. No erythema.   Vesicles noted over C3-C6 distribution of right arm         ED Course   Procedures  Labs Reviewed   ISTAT PROCEDURE - Abnormal; Notable for the following components:       Result Value    POC Glucose 162 (*)     All other components within normal limits           Imaging Results          CTA Neck (Final result)  Result time 12/08/19 07:02:22    Final result by Anel Haney MD (12/08/19 07:02:22)                 Impression:      CTA neck is within normal limits without evidence of hemodynamically significant stenosis, large vessel occlusion or other  significant abnormality.    Electronically signed by resident: Shelly Hernandez MD  Date:    12/08/2019  Time:    05:55    Electronically signed by: Anel Haney MD  Date:    12/08/2019  Time:    07:02             Narrative:    EXAMINATION:  CTA NECK    CLINICAL HISTORY:  Neck trauma, arterial inj suspected;    TECHNIQUE:  CT angiogram was performed from the level of the wandy to the EAC following the IV administration of 75mL of Omnipaque 350.   Sagittal and coronal reconstructions and maximum intensity projection reconstructions were performed. Arterial stenosis percentages are based on NASCET measurement criteria.    COMPARISON:  None    FINDINGS:  Aorta: The visualized thoracic aorta is nonaneurysmal.  The origins of the aortic branch vessels are within normal limits.    Extracranial carotid circulation: No hemodynamically significant stenosis, aneurysmal dilatation, or dissection.    Extracranial vertebral circulation: No hemodynamically significant stenosis, aneurysmal dilatation, or dissection.    Intracranial arteries (limited evaluation): No focal high-grade stenosis, occlusion, or aneurysm.  There is a fetal (ICA origin with ipsilateral P1 hypoplasia) configuration of the left PCA.    Venous structures (limited evaluation): Normal.    Non-vascular structures (limited evaluation): The visualized mucosal surfaces of the aerodigestive tract are within normal limits.  The vocal cords are relatively symmetric.  The parotid glands, submandibular glands and thyroid gland are unremarkable.  Evaluation of the oral cavity is slightly limited by artifact from dental amalgam.  Visualized osseous structures demonstrate no acute abnormalities.  The visualized lung apices are free of pleural fluid or focal consolidation                                   Medical Decision Making:   History:   Old Medical Records: I decided to obtain old medical records.  Clinical Tests:   Radiological Study: Ordered and Reviewed       APC /  Resident Notes:   Patient is a 49 year old female presents to the ED for emergent evaluation of right shoulder pain.     Will order pain medication for symptomatic relief. Will continue to monitor.     Differential diagnoses include, but are not limited to: shoulder impingement syndrome, bursitis, calcific tendonitis, shingles, and muscle strain.     6:22 AM  Patient requesting IV dilaudid for right shoulder pain. Patient educated I am not able to administer parenteral opiates, as requested by the patient.  IV opiates are not safe medications to use as there are significant risks in terms of addiction and abuse.  It is not consistent with our most recent ED Best Practice guidelines.  For the patient's safety, I have offered an alternative pain management regimen. Patient continues to refuse alternative therapies. Patient is cursing and yelling at nursing staff. When questioned why she is refusing alternative therapies patient reports already taking medications at home, but did not disclose earlier in visit.     istat WNL. CTA neck found to have no  evidence of hemodynamically significant stenosis, large vessel occlusion or other significant abnormality.    Patient reassessed, reports symptoms improved with ED management. I have discussed emergency department findings, and plan with the patient. Will discharge home with valtrex and F/U with sports medicine and PCP. Patient verbalizes understanding of plan and agrees. Return precautions given.     I have discussed and reviewed with my supervising physician.        Clinical Impression:       ICD-10-CM ICD-9-CM   1. Herpes zoster with complication B02.8 053.8   2. Strain of right shoulder, initial encounter S46.911A 840.9         Disposition:   Disposition: Discharged  Condition: Stable                     Archana Engle PA-C  12/08/19 1023

## 2019-12-08 NOTE — ED NOTES
Pt verbalized that she is upset because MD/PA will not prescribe Dilaudid for pain. Pt given explanation by KERLINE Engle why dilaudid is not necessary. Pt made aware that NORCO will be given for pain.

## 2019-12-08 NOTE — ED NOTES
Discharge home with friend to drive, states understanding to follow up as directed. Ambulates out of ED without difficulty. RX given,

## 2019-12-08 NOTE — ED PROVIDER NOTES
Encounter Date: 12/8/2019       History     Chief Complaint   Patient presents with    Neck Pain     seen few hours ago, still in pain     49-year-old female with no significant medical history presents with diffuse upper extremity and neck pain. Symptoms are worse on the right.  She was seen earlier today and diagnosed with shingles.  She was discharged with Valtrex.  Several days ago she was cutting brush and had pain to the bilateral upper extremities. Several days later the rash appeared.  She saw chiropractor who attempted to help her symptoms by using a TENS device.  She denies any significant trauma. She has had associated nausea.  She denies vomiting, diarrhea, fever, cough, shortness of breath, chest pain, abdominal pain, or dysuria.  I reviewed her previous ED note.  A ten point review of systems was completed and is negative except as documented above.  Patient denies any other acute medical complaint.  The patients available PMH, PSH, Social History, medications, allergies, and triage vital signs were reviewed just prior to their medical evaluation.        Review of patient's allergies indicates:  No Known Allergies  Past Medical History:   Diagnosis Date    Depression      Past Surgical History:   Procedure Laterality Date    ABDOMINAL SURGERY      HAND SURGERY      TONSILLECTOMY       Family History   Problem Relation Age of Onset    Cancer Mother     Diabetes Father      Social History     Tobacco Use    Smoking status: Never Smoker    Smokeless tobacco: Never Used   Substance Use Topics    Alcohol use: Yes     Comment: social    Drug use: Never     Review of Systems   Constitutional: Negative for fever.   HENT: Negative for sore throat.    Eyes: Negative for visual disturbance.   Respiratory: Negative for cough and shortness of breath.    Cardiovascular: Negative for chest pain.   Gastrointestinal: Negative for abdominal pain, diarrhea, nausea and vomiting.   Genitourinary: Negative for  dysuria.   Musculoskeletal: Positive for arthralgias, myalgias and neck pain.   Skin: Positive for rash. Negative for wound.   Allergic/Immunologic: Negative for immunocompromised state.   Neurological: Negative for syncope.   Psychiatric/Behavioral: Negative for confusion.   All other systems reviewed and are negative.      Physical Exam     Initial Vitals [12/08/19 1227]   BP Pulse Resp Temp SpO2   (!) 149/83 68 18 98.8 °F (37.1 °C) 100 %      MAP       --         Physical Exam    Nursing note and vitals reviewed.  Constitutional: She appears well-developed and well-nourished. She is not diaphoretic. No distress.   HENT:   Head: Normocephalic and atraumatic.   Nose: Nose normal.   Eyes: Conjunctivae are normal. Right eye exhibits no discharge. Left eye exhibits no discharge.   Neck: Normal range of motion. Neck supple.   Tight muscles, no midline ttp   Cardiovascular: Normal rate, regular rhythm and normal heart sounds. Exam reveals no gallop and no friction rub.    No murmur heard.  Pulmonary/Chest: Breath sounds normal. No respiratory distress. She has no wheezes. She has no rhonchi. She has no rales.   Musculoskeletal: Normal range of motion. She exhibits no edema or tenderness.   Normal rotator cuff strength on right   Neurological: She is alert and oriented to person, place, and time. GCS score is 15. GCS eye subscore is 4. GCS verbal subscore is 5. GCS motor subscore is 6.   Skin: Skin is warm and dry. Rash noted. There is erythema.   Dermatomal, vesicular rash to RUE c/w shingles   Psychiatric: She has a normal mood and affect. Her behavior is normal. Judgment and thought content normal.         ED Course   Procedures  Labs Reviewed - No data to display       Imaging Results    None          Medical Decision Making:   History:   I obtained history from: someone other than patient.       <> Summary of History: Friend assists HPI  Old Medical Records: I decided to obtain old medical records.  Old Records  Summarized: records from previous admission(s).       <> Summary of Records: Previous ED note  ED Management:  49-year-old female presents with neuropathic pain from shingles likely made worse by musculoskeletal pain.  Vitals with hypertension.  Physical exam as above.  No indication for further labs or imaging.  Treated with gabapentin and Phenergan.  Will discharge with these medicines and naproxen.  She will call her regular physician tomorrow to arrange close follow-up.  Patient will return to ED for worsening symptoms, inability to eat/drink, fever greater than 100.4, or any other concerns.  Did extensive bedside teaching with return precautions.  All questions answered.  The patient acknowledges understanding.  Gave verbal discharge instructions.                                 Clinical Impression:   Final diagnoses:  [B02.8] Herpes zoster with other complication (Primary)  [M79.2] Neuropathic pain      Disposition:   Disposition: Discharged  Condition: Stable                     Diego Wiggins MD  12/08/19 [id number]

## 2019-12-08 NOTE — ED NOTES
"Pt refusing diazepam. Pt crying and yelling, "I need stronger pain medication, I need dilaudid." MD Marianne made aware.   "

## 2019-12-08 NOTE — DISCHARGE INSTRUCTIONS
Do not drink or drive on on these medications.    Our goal in the emergency department is to always give you outstanding care and exceptional service. You may receive a survey by mail or e-mail in the next week regarding your experience in our ED. We would greatly appreciate your completing and returning the survey. Your feedback provides us with a way to recognize our staff who give very good care and it helps us learn how to improve when your experience was below our aspiration of excellence.

## 2019-12-10 ENCOUNTER — TELEPHONE (OUTPATIENT)
Dept: INTERNAL MEDICINE | Facility: CLINIC | Age: 49
End: 2019-12-10

## 2019-12-10 ENCOUNTER — OFFICE VISIT (OUTPATIENT)
Dept: INTERNAL MEDICINE | Facility: CLINIC | Age: 49
End: 2019-12-10
Payer: COMMERCIAL

## 2019-12-10 VITALS
TEMPERATURE: 99 F | HEART RATE: 80 BPM | HEIGHT: 64 IN | BODY MASS INDEX: 18.82 KG/M2 | SYSTOLIC BLOOD PRESSURE: 124 MMHG | WEIGHT: 110.25 LBS | OXYGEN SATURATION: 99 % | DIASTOLIC BLOOD PRESSURE: 68 MMHG

## 2019-12-10 DIAGNOSIS — F32.A DEPRESSION, UNSPECIFIED DEPRESSION TYPE: ICD-10-CM

## 2019-12-10 DIAGNOSIS — M79.2 NEUROPATHIC PAIN: ICD-10-CM

## 2019-12-10 DIAGNOSIS — B02.8 HERPES ZOSTER WITH OTHER COMPLICATION: Primary | ICD-10-CM

## 2019-12-10 PROCEDURE — 3008F PR BODY MASS INDEX (BMI) DOCUMENTED: ICD-10-PCS | Mod: CPTII,S$GLB,, | Performed by: INTERNAL MEDICINE

## 2019-12-10 PROCEDURE — 99213 OFFICE O/P EST LOW 20 MIN: CPT | Mod: S$GLB,,, | Performed by: INTERNAL MEDICINE

## 2019-12-10 PROCEDURE — 99999 PR PBB SHADOW E&M-EST. PATIENT-LVL III: CPT | Mod: PBBFAC,,, | Performed by: INTERNAL MEDICINE

## 2019-12-10 PROCEDURE — 99213 PR OFFICE/OUTPT VISIT, EST, LEVL III, 20-29 MIN: ICD-10-PCS | Mod: S$GLB,,, | Performed by: INTERNAL MEDICINE

## 2019-12-10 PROCEDURE — 99999 PR PBB SHADOW E&M-EST. PATIENT-LVL III: ICD-10-PCS | Mod: PBBFAC,,, | Performed by: INTERNAL MEDICINE

## 2019-12-10 PROCEDURE — 3008F BODY MASS INDEX DOCD: CPT | Mod: CPTII,S$GLB,, | Performed by: INTERNAL MEDICINE

## 2019-12-10 RX ORDER — METHYLPREDNISOLONE 4 MG/1
TABLET ORAL
Qty: 1 PACKAGE | Refills: 0 | Status: SHIPPED | OUTPATIENT
Start: 2019-12-10 | End: 2019-12-31

## 2019-12-10 RX ORDER — TRAMADOL HYDROCHLORIDE 50 MG/1
50 TABLET ORAL EVERY 6 HOURS PRN
Qty: 30 TABLET | Refills: 0 | Status: SHIPPED | OUTPATIENT
Start: 2019-12-10

## 2019-12-10 NOTE — PROGRESS NOTES
Subjective:       Patient ID: Jennifer Caballero is a 49 y.o. female.    Chief Complaint: Herpes Zoster    49 year old lady recently dx'd with shingles has terrible pain.  Also seems to have pain related to heavy work with a saw at her property in [place]. Has history of depression which has increased due to the suicide of a friend.  She is very emotionally labile here in the office..  She has been referred to sports med for her shoulder pain.  Is taking gabapentin 300 tid with minimal relief of shingles    Review of Systems   Constitutional: Negative for activity change, chills, fatigue and fever.   HENT: Negative for congestion, ear pain, nosebleeds, postnasal drip, sinus pressure and sore throat.    Eyes: Negative.  Negative for visual disturbance.   Respiratory: Negative for cough, chest tightness, shortness of breath and wheezing.    Cardiovascular: Negative for chest pain.   Gastrointestinal: Negative for abdominal pain, diarrhea, nausea and vomiting.   Genitourinary: Negative for difficulty urinating, dysuria, frequency and urgency.   Musculoskeletal: Negative for arthralgias and neck stiffness.   Skin: Negative for rash.   Neurological: Negative for dizziness, weakness and headaches.   Psychiatric/Behavioral: Negative for sleep disturbance. The patient is not nervous/anxious.        Objective:      Physical Exam   Skin:            Assessment:       1. Herpes zoster with other complication    2. Neuropathic pain    3. Depression, unspecified depression type        Plan:   Jennifer was seen today for herpes zoster.    Diagnoses and all orders for this visit:    Herpes zoster with other complication    Neuropathic pain    Depression, unspecified depression type    Other orders  -     traMADol (ULTRAM) 50 mg tablet; Take 1 tablet (50 mg total) by mouth every 6 (six) hours as needed for Pain.  -     methylPREDNISolone (MEDROL DOSEPACK) 4 mg tablet; use as directed

## 2019-12-10 NOTE — TELEPHONE ENCOUNTER
----- Message from Emy Honeycutt sent at 12/10/2019 10:50 AM CST -----  Contact: Pt self Mobile/Home [phone]   Patient would like to get medical advice.  Symptoms (please be specific):  Possible shingles behind right shoulder blade and on patient's arm.  How long has patient had these symptoms:  For two weeks now  Pharmacy name and phone # (copy/paste from chart): N/A  Any drug allergies (copy/paste from chart):  No    Would the patient rather a call back or a response via [facility]?:  A phone call please.  Comments:  Patient would like a call back in regards to her wanting to get some medical advice before she come in on today please.

## 2019-12-18 ENCOUNTER — TELEPHONE (OUTPATIENT)
Dept: INTERNAL MEDICINE | Facility: CLINIC | Age: 49
End: 2019-12-18

## 2019-12-18 NOTE — TELEPHONE ENCOUNTER
----- Message from Edith Jerome sent at 12/18/2019 11:07 AM CST -----  Contact: [phone]  Patient is requesting a call from the office concerning medication valACYclovir (VALTREX) 1000 MG tablet. She has a few questions. MD did not prescribe medication to patient but would like to speak to MD.  Please advise thanks

## 2019-12-19 ENCOUNTER — TELEPHONE (OUTPATIENT)
Dept: INTERNAL MEDICINE | Facility: CLINIC | Age: 49
End: 2019-12-19

## 2019-12-19 NOTE — TELEPHONE ENCOUNTER
Is she having new sores or rash come out, or is it just that they are not completely healed? We usually do NOT need a second course of the antibiotic.

## 2019-12-19 NOTE — TELEPHONE ENCOUNTER
States not completely healed, also states in a lot of pain not asking for pain medication just would like to know what to do next

## 2019-12-19 NOTE — TELEPHONE ENCOUNTER
----- Message from Birdie Hicks sent at 12/19/2019  9:20 AM CST -----  Contact: Patient [phone]  2nd request    ---- Message from Edith Jerome sent at 12/18/2019 11:07 AM CST -----  Contact: [phone]  Patient is requesting a call from the office concerning medication valACYclovir (VALTREX) 1000 MG tablet. She has a few questions. MD did not prescribe medication to patient but would like to speak to MD.  Please advise thanks

## 2019-12-19 NOTE — TELEPHONE ENCOUNTER
----- Message from Birdie Hicks sent at 12/19/2019  9:20 AM CST -----  Contact: Patient [phone]  2nd request    ---- Message from Edith Jerome sent at 12/18/2019 11:07 AM CST -----  Contact: [phone]  Patient is requesting a call from the office concerning medication valACYclovir (VALTREX) 1000 MG tablet. She has a few questions. MD did not prescribe medication to patient but would like to speak to MD.  Please advise thanks

## 2019-12-19 NOTE — TELEPHONE ENCOUNTER
OK, so the pain can last for a while, but if rash spots are healing, no new antivirals needed.     May need follow up visit for pain management options and plan.

## 2020-12-24 ENCOUNTER — OFFICE VISIT (OUTPATIENT)
Dept: URGENT CARE | Facility: CLINIC | Age: 50
End: 2020-12-24
Payer: COMMERCIAL

## 2020-12-24 VITALS
TEMPERATURE: 98 F | OXYGEN SATURATION: 99 % | HEIGHT: 64 IN | BODY MASS INDEX: 18.78 KG/M2 | SYSTOLIC BLOOD PRESSURE: 127 MMHG | DIASTOLIC BLOOD PRESSURE: 71 MMHG | WEIGHT: 110 LBS | RESPIRATION RATE: 20 BRPM | HEART RATE: 84 BPM

## 2020-12-24 DIAGNOSIS — J01.90 ACUTE SINUSITIS, RECURRENCE NOT SPECIFIED, UNSPECIFIED LOCATION: Primary | ICD-10-CM

## 2020-12-24 LAB
CTP QC/QA: YES
SARS-COV-2 RDRP RESP QL NAA+PROBE: NEGATIVE

## 2020-12-24 PROCEDURE — 99214 OFFICE O/P EST MOD 30 MIN: CPT | Mod: S$GLB,,, | Performed by: FAMILY MEDICINE

## 2020-12-24 PROCEDURE — 3008F BODY MASS INDEX DOCD: CPT | Mod: CPTII,S$GLB,, | Performed by: FAMILY MEDICINE

## 2020-12-24 PROCEDURE — 3008F PR BODY MASS INDEX (BMI) DOCUMENTED: ICD-10-PCS | Mod: CPTII,S$GLB,, | Performed by: FAMILY MEDICINE

## 2020-12-24 PROCEDURE — U0002: ICD-10-PCS | Mod: QW,S$GLB,, | Performed by: FAMILY MEDICINE

## 2020-12-24 PROCEDURE — U0002 COVID-19 LAB TEST NON-CDC: HCPCS | Mod: QW,S$GLB,, | Performed by: FAMILY MEDICINE

## 2020-12-24 PROCEDURE — 99214 PR OFFICE/OUTPT VISIT, EST, LEVL IV, 30-39 MIN: ICD-10-PCS | Mod: S$GLB,,, | Performed by: FAMILY MEDICINE

## 2020-12-24 RX ORDER — CODEINE PHOSPHATE AND GUAIFENESIN 10; 100 MG/5ML; MG/5ML
5 SOLUTION ORAL 3 TIMES DAILY PRN
Qty: 50 ML | Refills: 0 | Status: SHIPPED | OUTPATIENT
Start: 2020-12-24 | End: 2021-01-03

## 2020-12-24 RX ORDER — AZITHROMYCIN 250 MG/1
TABLET, FILM COATED ORAL
Qty: 6 TABLET | Refills: 0 | Status: SHIPPED | OUTPATIENT
Start: 2020-12-24 | End: 2020-12-29

## 2020-12-24 NOTE — PATIENT INSTRUCTIONS
Sinusitis (Antibiotic Treatment)    The sinuses are air-filled spaces within the bones of the face. They connect to the inside of the nose. Sinusitis is an inflammation of the tissue lining the sinus cavity. Sinus inflammation can occur during a cold. It can also be due to allergies to pollens and other particles in the air. Sinusitis can cause symptoms of sinus congestion and fullness. A sinus infection causes fever, headache and facial pain. There is often green or yellow drainage from the nose or into the back of the throat (post-nasal drip). You have been given antibiotics to treat this condition.  Home care:  · Take the full course of antibiotics as instructed. Do not stop taking them, even if you feel better.  · Drink plenty of water, hot tea, and other liquids. This may help thin mucus. It also may promote sinus drainage.  · Heat may help soothe painful areas of the face. Use a towel soaked in hot water. Or,  the shower and direct the hot spray onto your face. Using a vaporizer along with a menthol rub at night may also help.   · An expectorant containing guaifenesin may help thin the mucus and promote drainage from the sinuses.  · Over-the-counter decongestants may be used unless a similar medicine was prescribed. Nasal sprays work the fastest. Use one that contains phenylephrine or oxymetazoline. First blow the nose gently. Then use the spray. Do not use these medicines more often than directed on the label or symptoms may get worse. You may also use tablets containing pseudoephedrine. Avoid products that combine ingredients, because side effects may be increased. Read labels. You can also ask the pharmacist for help. (NOTE: Persons with high blood pressure should not use decongestants. They can raise blood pressure.)  · Over-the-counter antihistamines may help if allergies contributed to your sinusitis.    · Do not use nasal rinses or irrigation during an acute sinus infection, unless told to by  your health care provider. Rinsing may spread the infection to other sinuses.  · Use acetaminophen or ibuprofen to control pain, unless another pain medicine was prescribed. (If you have chronic liver or kidney disease or ever had a stomach ulcer, talk with your doctor before using these medicines. Aspirin should never be used in anyone under 18 years of age who is ill with a fever. It may cause severe liver damage.)  · Don't smoke. This can worsen symptoms.  Follow-up care  Follow up with your healthcare provider or our staff if you are not improving within the next week.  When to seek medical advice  Call your healthcare provider if any of these occur:  · Facial pain or headache becoming more severe  · Stiff neck  · Unusual drowsiness or confusion  · Swelling of the forehead or eyelids  · Vision problems, including blurred or double vision  · Fever of 100.4ºF (38ºC) or higher, or as directed by your healthcare provider  · Seizure  · Breathing problems  · Symptoms not resolving within 10 days  Date Last Reviewed: 4/13/2015  © 0836-7255 [employer]. [address] [place] [address], [place] [address]. All rights reserved. This information is not intended as a substitute for professional medical care. Always follow your healthcare professional's instructions.

## 2020-12-24 NOTE — PROGRESS NOTES
"Subjective:       Patient ID: Jennifer Caballero is a 50 y.o. female.    Vitals:  height is 5' 4" (1.626 m) and weight is 49.9 kg (110 lb). Her temperature is 97.8 °F (36.6 °C). Her blood pressure is 127/71 and her pulse is 84. Her respiration is 20 and oxygen saturation is 99%.     Chief Complaint: Sinus Problem    HPI    Constitution: Negative for chills, sweating, fatigue and fever.   HENT: Positive for congestion and sinus pressure. Negative for ear pain, sinus pain, sore throat and voice change.    Neck: Negative for painful lymph nodes.   Eyes: Negative for eye redness.   Respiratory: Positive for cough. Negative for chest tightness, sputum production, bloody sputum, COPD, shortness of breath, stridor, wheezing and asthma.    Gastrointestinal: Positive for nausea. Negative for vomiting.   Musculoskeletal: Negative for muscle ache.   Skin: Negative for rash.   Allergic/Immunologic: Negative for seasonal allergies and asthma.   Neurological: Positive for headaches.   Hematologic/Lymphatic: Negative for swollen lymph nodes.       Objective:      Physical Exam   HENT:   Head: Normocephalic.   Nose: Nose normal.   Mouth/Throat: Mucous membranes are moist. No posterior oropharyngeal erythema.   Cardiovascular: Normal pulses.   Pulmonary/Chest: Effort normal. No respiratory distress.   Abdominal: Normal appearance.   Neurological: She is alert.   Nursing note and vitals reviewed.    Results for orders placed or performed in visit on 12/24/20   POCT COVID-19 Rapid Screening   Result Value Ref Range    POC Rapid COVID Negative Negative     Acceptable Yes          Assessment:       1. Acute sinusitis, recurrence not specified, unspecified location        Plan:         Acute sinusitis, recurrence not specified, unspecified location  -     POCT COVID-19 Rapid Screening  -     azithromycin (ZITHROMAX) 250 MG tablet; Take 2 tablets (500 mg) on  Day 1,  followed by 1 tablet (250 mg) once daily on Days 2 through 5.  " Dispense: 6 tablet; Refill: 0  -     guaifenesin-codeine 100-10 mg/5 ml (VIRTUSSIN AC)  mg/5 mL syrup; Take 5 mLs by mouth 3 (three) times daily as needed for Cough.  Dispense: 50 mL; Refill: 0

## 2023-05-04 NOTE — TELEPHONE ENCOUNTER
Finished Valtrex took for 7 days shingles still bad pt states still in pain would like to know if I refill is advisable and if so will it be ok for her liver        Bcc Superficial Histology Text: There were aggregates of basaloid cells budding from the epidermis.

## 2023-12-25 ENCOUNTER — OFFICE VISIT (OUTPATIENT)
Dept: URGENT CARE | Facility: CLINIC | Age: 53
End: 2023-12-25
Payer: COMMERCIAL

## 2023-12-25 VITALS
OXYGEN SATURATION: 98 % | SYSTOLIC BLOOD PRESSURE: 130 MMHG | BODY MASS INDEX: 17.9 KG/M2 | RESPIRATION RATE: 19 BRPM | HEART RATE: 72 BPM | DIASTOLIC BLOOD PRESSURE: 73 MMHG | WEIGHT: 104.88 LBS | HEIGHT: 64 IN | TEMPERATURE: 99 F

## 2023-12-25 DIAGNOSIS — R09.81 NASAL CONGESTION: ICD-10-CM

## 2023-12-25 DIAGNOSIS — J10.1 INFLUENZA A: Primary | ICD-10-CM

## 2023-12-25 DIAGNOSIS — R05.9 COUGH, UNSPECIFIED TYPE: ICD-10-CM

## 2023-12-25 LAB
CTP QC/QA: YES
CTP QC/QA: YES
POC MOLECULAR INFLUENZA A AGN: POSITIVE
POC MOLECULAR INFLUENZA B AGN: NEGATIVE
SARS-COV-2 AG RESP QL IA.RAPID: NEGATIVE

## 2023-12-25 PROCEDURE — 87502 POCT INFLUENZA A/B MOLECULAR: ICD-10-PCS | Mod: QW,,,

## 2023-12-25 PROCEDURE — 87811 SARS CORONAVIRUS 2 ANTIGEN POCT, MANUAL READ: ICD-10-PCS | Mod: QW,S$GLB,,

## 2023-12-25 PROCEDURE — 87811 SARS-COV-2 COVID19 W/OPTIC: CPT | Mod: QW,S$GLB,,

## 2023-12-25 PROCEDURE — 99214 OFFICE O/P EST MOD 30 MIN: CPT | Mod: S$GLB,,,

## 2023-12-25 PROCEDURE — 99214 PR OFFICE/OUTPT VISIT, EST, LEVL IV, 30-39 MIN: ICD-10-PCS | Mod: S$GLB,,,

## 2023-12-25 PROCEDURE — 87502 INFLUENZA DNA AMP PROBE: CPT | Mod: QW,,,

## 2023-12-25 RX ORDER — PROMETHAZINE HYDROCHLORIDE AND DEXTROMETHORPHAN HYDROBROMIDE 6.25; 15 MG/5ML; MG/5ML
5 SYRUP ORAL EVERY 8 HOURS PRN
Qty: 118 ML | Refills: 0 | Status: SHIPPED | OUTPATIENT
Start: 2023-12-25 | End: 2023-12-25

## 2023-12-25 RX ORDER — PROMETHAZINE HYDROCHLORIDE AND DEXTROMETHORPHAN HYDROBROMIDE 6.25; 15 MG/5ML; MG/5ML
5 SYRUP ORAL EVERY 8 HOURS PRN
Qty: 118 ML | Refills: 0 | Status: SHIPPED | OUTPATIENT
Start: 2023-12-25

## 2023-12-25 NOTE — LETTER
December 26, 2023      [facility]  [facility] [address] [employer], [address]  [place] [address]  Phone: [phone]  Fax: [phone]       Patient: Jennifer Caballero   YOB: 1970  Date of Visit: 12/26/2023    To Whom It May Concern:    Cali Caballero  was at [facility] on 12/26/2023. The patient may return to work/school on 12/29/2023  with no restrictions. If you have any questions or concerns, or if I can be of further assistance, please do not hesitate to contact me.    Sincerely,    Reyna Mason, RT(R)

## 2023-12-25 NOTE — PROGRESS NOTES
"Subjective:      Patient ID: Jennifer Caballero is a 53 y.o. female.    Vitals:  height is 5' 4" (1.626 m) and weight is 47.6 kg (104 lb 14.4 oz). Her oral temperature is 99.2 °F (37.3 °C). Her blood pressure is 130/73 and her pulse is 72. Her respiration is 19 and oxygen saturation is 98%.     Chief Complaint: Cough (Pt states that her symptoms started 1 week ago. Patient states that she suffers from sinusitis. Patient states that her symptoms are the following: cough w/ dry at times, headache, sneezing, nasal congestion, sinus pressure, bodyache, chills, sweats, nausea, post nasal drip. Patient states that she has taken allegra d, flonase, tussin cough med)    This is a 53 y.o. female who presents today with a chief complaint of Cough: Pt states that her symptoms started 1 week ago. Patient states that she suffers from sinusitis. Patient states that her symptoms are the following: cough w/ dry at times, headache, sneezing, nasal congestion, sinus pressure, bodyache, chills, sweats, nausea, post nasal drip. Patient states that she has taken allegra d, flonase, tussin cough med  Patient presents with:  Cough: Pt states that her symptoms started 1 week ago. Patient states that she suffers from sinusitis. Patient states that her symptoms are the following: cough w/ dry at times, headache, sneezing, nasal congestion, sinus pressure, bodyache, chills, sweats, nausea, post nasal drip. Patient states that she has taken allegra d, flonase, tussin cough med         Cough  This is a new problem. The current episode started 1 to 4 weeks ago. The problem has been gradually worsening. The problem occurs every few minutes. The cough is Non-productive. Associated symptoms include chills, headaches, nasal congestion, postnasal drip, a sore throat and sweats. Associated symptoms comments: Bodyache, nausea. Nothing aggravates the symptoms. Treatments tried: allegra d, flonase, tussin cough med. The treatment provided no relief. "       Constitution: Positive for chills.   HENT:  Positive for postnasal drip and sore throat.    Neck: neck negative.   Cardiovascular: Negative.    Eyes: Negative.    Respiratory:  Positive for cough.    Gastrointestinal: Negative.    Endocrine: negative.   Genitourinary: Negative.    Musculoskeletal: Negative.    Skin: Negative.    Allergic/Immunologic: Negative.    Neurological:  Positive for headaches.   Hematologic/Lymphatic: Negative.    Psychiatric/Behavioral: Negative.        Objective:     Physical Exam   Constitutional: She is oriented to person, place, and time.   HENT:   Head: Normocephalic and atraumatic.   Ears:   Right Ear: Tympanic membrane, external ear and ear canal normal.   Left Ear: Tympanic membrane, external ear and ear canal normal.   Nose: Congestion present.   Mouth/Throat: Posterior oropharyngeal erythema present.   Eyes: Conjunctivae are normal. Pupils are equal, round, and reactive to light. Extraocular movement intact   Neck: Neck supple.   Cardiovascular: Normal rate, regular rhythm, normal heart sounds and normal pulses.   Pulmonary/Chest: Effort normal and breath sounds normal.   Abdominal: Normal appearance.   Musculoskeletal: Normal range of motion.         General: Normal range of motion.   Neurological: no focal deficit. She is alert, oriented to person, place, and time and at baseline.   Skin: Skin is warm.   Psychiatric: Her behavior is normal. Mood, judgment and thought content normal.   Nursing note and vitals reviewed.      Assessment:     1. Influenza A    2. Cough, unspecified type    3. Nasal congestion      Results for orders placed or performed in visit on 12/25/23   SARS Coronavirus 2 Antigen, POCT Manual Read   Result Value Ref Range    SARS Coronavirus 2 Antigen Negative Negative     Acceptable Yes    POCT Influenza A/B MOLECULAR   Result Value Ref Range    POC Molecular Influenza A Ag Positive (A) Negative, Not Reported    POC Molecular Influenza B  Ag Negative Negative, Not Reported     Acceptable Yes       PA and lateral views of the chest were performed.     COMPARISON:  None     FINDINGS:  The lungs are clear, with normal appearance of pulmonary vasculature and no pleural effusion or pneumothorax.     The cardiac silhouette is normal in size. The hilar and mediastinal contours are unremarkable.     No significant bony abnormality.     Impression:     No acute abnormality.     Plan:       Influenza A  -     XR CHEST PA AND LATERAL; Future; Expected date: 12/25/2023  -     Discontinue: promethazine-dextromethorphan (PROMETHAZINE-DM) 6.25-15 mg/5 mL Syrp; Take 5 mLs by mouth every 8 (eight) hours as needed (cough).  Dispense: 118 mL; Refill: 0  -     promethazine-dextromethorphan (PROMETHAZINE-DM) 6.25-15 mg/5 mL Syrp; Take 5 mLs by mouth every 8 (eight) hours as needed (cough).  Dispense: 118 mL; Refill: 0    Cough, unspecified type  -     SARS Coronavirus 2 Antigen, POCT Manual Read  -     POCT Influenza A/B MOLECULAR  -     XR CHEST PA AND LATERAL; Future; Expected date: 12/25/2023  -     Discontinue: promethazine-dextromethorphan (PROMETHAZINE-DM) 6.25-15 mg/5 mL Syrp; Take 5 mLs by mouth every 8 (eight) hours as needed (cough).  Dispense: 118 mL; Refill: 0  -     promethazine-dextromethorphan (PROMETHAZINE-DM) 6.25-15 mg/5 mL Syrp; Take 5 mLs by mouth every 8 (eight) hours as needed (cough).  Dispense: 118 mL; Refill: 0    Nasal congestion  -     SARS Coronavirus 2 Antigen, POCT Manual Read  -     POCT Influenza A/B MOLECULAR  -     XR CHEST PA AND LATERAL; Future; Expected date: 12/25/2023  -     Discontinue: promethazine-dextromethorphan (PROMETHAZINE-DM) 6.25-15 mg/5 mL Syrp; Take 5 mLs by mouth every 8 (eight) hours as needed (cough).  Dispense: 118 mL; Refill: 0  -     promethazine-dextromethorphan (PROMETHAZINE-DM) 6.25-15 mg/5 mL Syrp; Take 5 mLs by mouth every 8 (eight) hours as needed (cough).  Dispense: 118 mL; Refill: 0

## 2023-12-26 ENCOUNTER — TELEPHONE (OUTPATIENT)
Dept: URGENT CARE | Facility: CLINIC | Age: 53
End: 2023-12-26
Payer: COMMERCIAL

## 2023-12-26 NOTE — TELEPHONE ENCOUNTER
Patient stated she was still very sick and asked if we could send in Tamiflu. I explained to the patient that because of the onset of her symptoms being a week ago, it was too late to prescribe that medication and that is why the provider didn't call Tamiflu in yesterday. Patient asked how long she would be sick for and I explained that since her illness was viral, there was not a specific amount of days that she would be sick. I instructed patient to drink plenty of fluids and get as much rest as possible and to take any OTC medications that she was instructed to take by the provider. I also wrote the patient a return to work note for 12/29/2023. Patient is a  and stated she needed to be well in order to return to work.           ----- Message from Tara Venegas sent at 12/26/2023  2:03 PM CST -----  Contact: [phone]  Patient called for medical advice, very sick.  requesting a call back.    Thank you

## 2025-01-06 ENCOUNTER — OFFICE VISIT (OUTPATIENT)
Facility: CLINIC | Age: 55
End: 2025-01-06
Payer: COMMERCIAL

## 2025-01-06 VITALS
DIASTOLIC BLOOD PRESSURE: 80 MMHG | SYSTOLIC BLOOD PRESSURE: 132 MMHG | WEIGHT: 106.5 LBS | HEIGHT: 64 IN | BODY MASS INDEX: 18.18 KG/M2

## 2025-01-06 DIAGNOSIS — Z01.419 WELL WOMAN EXAM: Primary | ICD-10-CM

## 2025-01-06 DIAGNOSIS — N89.8 VAGINAL IRRITATION: ICD-10-CM

## 2025-01-06 PROCEDURE — 3079F DIAST BP 80-89 MM HG: CPT | Mod: CPTII,S$GLB,, | Performed by: OBSTETRICS & GYNECOLOGY

## 2025-01-06 PROCEDURE — 3075F SYST BP GE 130 - 139MM HG: CPT | Mod: CPTII,S$GLB,, | Performed by: OBSTETRICS & GYNECOLOGY

## 2025-01-06 PROCEDURE — 88175 CYTOPATH C/V AUTO FLUID REDO: CPT | Performed by: OBSTETRICS & GYNECOLOGY

## 2025-01-06 PROCEDURE — 3008F BODY MASS INDEX DOCD: CPT | Mod: CPTII,S$GLB,, | Performed by: OBSTETRICS & GYNECOLOGY

## 2025-01-06 PROCEDURE — 1159F MED LIST DOCD IN RCRD: CPT | Mod: CPTII,S$GLB,, | Performed by: OBSTETRICS & GYNECOLOGY

## 2025-01-06 PROCEDURE — 87624 HPV HI-RISK TYP POOLED RSLT: CPT | Performed by: OBSTETRICS & GYNECOLOGY

## 2025-01-06 PROCEDURE — 99386 PREV VISIT NEW AGE 40-64: CPT | Mod: S$GLB,,, | Performed by: OBSTETRICS & GYNECOLOGY

## 2025-01-06 PROCEDURE — 99999 PR PBB SHADOW E&M-EST. PATIENT-LVL III: CPT | Mod: PBBFAC,,, | Performed by: OBSTETRICS & GYNECOLOGY

## 2025-01-06 PROCEDURE — 81515 NFCT DS BV&VAGINITIS DNA ALG: CPT | Performed by: OBSTETRICS & GYNECOLOGY

## 2025-01-06 NOTE — PROGRESS NOTES
"CC: Well woman exam    Jennifer Caballero is a 54 y.o. female No obstetric history on file. presents for well woman exam.  LMP: Patient's last menstrual period was 04/21/2017..  No issues, problems, or complaints.  Recently took antibiotics and wants to know if she has a yeast infection.  Currently no discharge or itching but does have some irritation.    OB History    No obstetric history on file.       Gynecology   Past Medical History:   Diagnosis Date    Depression      Past Surgical History:   Procedure Laterality Date    ABDOMINAL SURGERY      HAND SURGERY      TONSILLECTOMY       Social History     Socioeconomic History    Marital status: Single   Tobacco Use    Smoking status: Never    Smokeless tobacco: Never   Substance and Sexual Activity    Alcohol use: Yes     Comment: social    Drug use: Never    Sexual activity: Never     Family History   Problem Relation Name Age of Onset    Cancer Mother      Diabetes Father           /80 (BP Location: Left arm, Patient Position: Sitting)   Ht 5' 4" (1.626 m)   Wt 48.3 kg (106 lb 7.7 oz)   LMP 04/21/2017   BMI 18.28 kg/m²       ROS  Review of Systems   Constitutional: Negative.    Gastrointestinal: Negative.    Genitourinary:         Vaginal irritation   Neurological: Negative.    Psychiatric/Behavioral: Negative.            PHYSICAL EXAM:  Physical Exam  Vitals reviewed.   Constitutional:       Appearance: Normal appearance. She is normal weight.   Chest:   Breasts:     Breasts are symmetrical.      Right: Normal. No mass, nipple discharge, skin change or tenderness.      Left: Normal. No mass, nipple discharge, skin change or tenderness.   Abdominal:      General: Abdomen is flat.      Palpations: Abdomen is soft.      Tenderness: There is no abdominal tenderness. There is no guarding or rebound.   Genitourinary:     General: Normal vulva.      Exam position: Lithotomy position.      Labia:         Right: No rash, tenderness or lesion.         Left: No rash, " tenderness or lesion.       Vagina: Normal.      Cervix: Normal. No cervical motion tenderness or discharge.      Uterus: Normal.       Adnexa: Right adnexa normal and left adnexa normal.        Right: No mass, tenderness or fullness.          Left: No mass, tenderness or fullness.     Lymphadenopathy:      Upper Body:      Right upper body: No axillary adenopathy.      Left upper body: No axillary adenopathy.   Neurological:      Mental Status: She is alert.          Well woman exam  -     Mammo Digital Screening Bilat w/ Colin; Future; Expected date: 01/06/2025  -     Liquid-Based Pap Smear, Screening  -     HPV High Risk Genotypes, PCR    Vaginal irritation s/p antibiotics, vaginosis swab jose    Recommended calcium 1200mg QD and weight bearing exercises  H/o vitamin D deficiency and is currently receiving vitamin D    She is longer taking Adderall or Lexapro    Patient has had several colonoscopies in the past as recent as a couple of years ago.  Reviewed Care Everywhere and I could not find previous procedure notes or pathology  Recommended following up with GI physician to determine when follow up is needed      Patient was counseled today on A.C.S. Pap guidelines and recommendations for yearly pelvic exams, mammograms and monthly self breast exams; to see her PCP for other health maintenance.     No follow-ups on file.

## 2025-01-07 LAB
BACTERIAL VAGINOSIS DNA: NOT DETECTED
CANDIDA GLABRATA/KRUSEI: NOT DETECTED
CANDIDA RRNA VAG QL PROBE: NOT DETECTED
TRICHOMONAS VAGINALIS: NOT DETECTED

## 2025-01-09 LAB
FINAL PATHOLOGIC DIAGNOSIS: NORMAL
Lab: NORMAL

## 2025-02-03 ENCOUNTER — TELEPHONE (OUTPATIENT)
Dept: OBSTETRICS AND GYNECOLOGY | Facility: CLINIC | Age: 55
End: 2025-02-03
Payer: COMMERCIAL

## 2025-02-03 NOTE — TELEPHONE ENCOUNTER
----- Message from Keila sent at 2/3/2025 11:28 AM CST -----  Regarding: Madalyn call back  Name of Who is Calling:          What is the request in detail:  Pt was seen a few weeks ago. She is asking if Madalyn would call her back. She states that she has some questions, did not disclose what. Please have Madalyn call to advise         Can the clinic reply by [facility]:no          What Number to Call Back if not in [facility]: [phone]

## 2025-02-03 NOTE — TELEPHONE ENCOUNTER
Called patient and informed patient that Dr. Diaz messaged her with results from Pap smear and yeast swab. Per Dr. Diaz's notes, the results were normal. Patient said she could not access her mychart and also needed help scheduling mammogram. Mammogram was scheduled for 02/17/2025 @ 3:30PM. No further action required.

## 2025-02-17 ENCOUNTER — HOSPITAL ENCOUNTER (OUTPATIENT)
Dept: RADIOLOGY | Facility: OTHER | Age: 55
Discharge: HOME | End: 2025-02-17
Attending: OBSTETRICS & GYNECOLOGY
Payer: COMMERCIAL

## 2025-02-17 DIAGNOSIS — Z12.31 ENCOUNTER FOR SCREENING MAMMOGRAM FOR BREAST CANCER: ICD-10-CM

## 2025-02-17 DIAGNOSIS — Z01.419 WELL WOMAN EXAM: ICD-10-CM

## 2025-02-17 PROCEDURE — 77067 SCR MAMMO BI INCL CAD: CPT | Mod: TC

## 2025-02-21 ENCOUNTER — RESULTS FOLLOW-UP (OUTPATIENT)
Dept: EMERGENCY MEDICINE | Facility: OTHER | Age: 55
End: 2025-02-21